# Patient Record
Sex: MALE | Race: WHITE | NOT HISPANIC OR LATINO | Employment: FULL TIME | URBAN - METROPOLITAN AREA
[De-identification: names, ages, dates, MRNs, and addresses within clinical notes are randomized per-mention and may not be internally consistent; named-entity substitution may affect disease eponyms.]

---

## 2017-10-18 ENCOUNTER — OFFICE VISIT (OUTPATIENT)
Dept: URGENT CARE | Facility: CLINIC | Age: 47
End: 2017-10-18
Payer: COMMERCIAL

## 2017-10-18 VITALS
BODY MASS INDEX: 34.55 KG/M2 | SYSTOLIC BLOOD PRESSURE: 183 MMHG | RESPIRATION RATE: 18 BRPM | HEART RATE: 99 BPM | OXYGEN SATURATION: 99 % | WEIGHT: 215 LBS | HEIGHT: 66 IN | DIASTOLIC BLOOD PRESSURE: 122 MMHG | TEMPERATURE: 99 F

## 2017-10-18 DIAGNOSIS — I10 HYPERTENSION, UNSPECIFIED TYPE: Primary | ICD-10-CM

## 2017-10-18 PROCEDURE — 99203 OFFICE O/P NEW LOW 30 MIN: CPT | Mod: S$GLB,,, | Performed by: EMERGENCY MEDICINE

## 2017-10-18 RX ORDER — LISINOPRIL 20 MG/1
20 TABLET ORAL DAILY
Qty: 30 TABLET | Refills: 0 | Status: SHIPPED | OUTPATIENT
Start: 2017-10-18 | End: 2017-11-06 | Stop reason: SDUPTHER

## 2017-10-18 NOTE — PROGRESS NOTES
Subjective:       Patient ID: Tracy Gabriel is a 47 y.o. male.    Chief Complaint: Hypertension (2 days )    Hypertension   This is a new problem. The current episode started in the past 7 days (2 days ago ). Pertinent negatives include no blurred vision, chest pain, headaches or shortness of breath. The current treatment provides no improvement.     Review of Systems   Constitution: Negative for chills and fever.   HENT: Negative for sore throat.    Eyes: Negative for blurred vision.   Cardiovascular: Negative for chest pain.   Respiratory: Negative for shortness of breath.    Skin: Negative for rash.   Musculoskeletal: Negative for back pain and joint pain.   Gastrointestinal: Negative for abdominal pain, diarrhea, nausea and vomiting.   Neurological: Negative for headaches.   Psychiatric/Behavioral: The patient is not nervous/anxious.        Objective:      Physical Exam   Constitutional: He is oriented to person, place, and time. He appears well-developed and well-nourished. He is cooperative.  Non-toxic appearance. He does not appear ill. No distress.   overweight   HENT:   Head: Normocephalic and atraumatic.   Right Ear: Hearing, tympanic membrane, external ear and ear canal normal.   Left Ear: Hearing, tympanic membrane, external ear and ear canal normal.   Nose: Nose normal. No mucosal edema, rhinorrhea or nasal deformity. No epistaxis. Right sinus exhibits no maxillary sinus tenderness and no frontal sinus tenderness. Left sinus exhibits no maxillary sinus tenderness and no frontal sinus tenderness.   Mouth/Throat: Uvula is midline, oropharynx is clear and moist and mucous membranes are normal. No trismus in the jaw. Normal dentition. No uvula swelling. No posterior oropharyngeal erythema.   Eyes: Conjunctivae and lids are normal. Right eye exhibits no discharge. Left eye exhibits no discharge. No scleral icterus.   Sclera clear bilat   Neck: Trachea normal, normal range of motion, full passive range  of motion without pain and phonation normal. Neck supple.   Cardiovascular: Normal rate, regular rhythm, normal heart sounds, intact distal pulses and normal pulses.    Pulmonary/Chest: Effort normal and breath sounds normal. No respiratory distress.   Abdominal: Soft. Normal appearance and bowel sounds are normal. He exhibits no distension, no pulsatile midline mass and no mass. There is no tenderness.   Musculoskeletal: Normal range of motion. He exhibits no edema or deformity.   Neurological: He is alert and oriented to person, place, and time. He exhibits normal muscle tone. Coordination normal.   Skin: Skin is warm, dry and intact. He is not diaphoretic. No pallor.   Psychiatric: He has a normal mood and affect. His speech is normal and behavior is normal. Judgment and thought content normal. Cognition and memory are normal.   Nursing note and vitals reviewed.      Assessment:       1. Hypertension, unspecified type        Plan:       Tracy was seen today for hypertension.    Diagnoses and all orders for this visit:    Hypertension, unspecified type    Other orders  -     lisinopril (PRINIVIL,ZESTRIL) 20 MG tablet; Take 1 tablet (20 mg total) by mouth once daily.

## 2017-10-18 NOTE — PATIENT INSTRUCTIONS
Go to the Emergency Room if symptoms or condition worsens in any way    Follow up with Primary Care Provider in 5-7 days      High Blood Pressure, New, Begin Treatment  Your blood pressure was high enough today to start treatment with medicines. Often health care providers dont know what causes high blood pressure (hypertension). But it can be controlled with lifestyle changes and medicines. High blood pressure usually has no symptoms. But it can sometimes cause headache, dizziness, blurred vision, a rushing sound in your ears, chest pain, or shortness of breath. But even without symptoms, high blood pressure thats not treated raises your risk for heart attack and stroke. High blood pressure is a serious health risk and shouldnt be ignored.    A blood pressure reading is made up of two numbers: a higher number over a lower number. The top number is the systolic pressure. The bottom number is the diastolic pressure. A normal blood pressure is a systolic pressure of less than 120 over a diastolic pressure less than 80. You will see your blood pressure readings written together. For example, a person with a systolic pressure of 118 and a diastolic pressure of 78 will have 118/78 written in the medical record.  High blood pressure is when either the top number is 140 or higher, or the bottom number is 90 or higher. High blood pressure is diagnosed when multiple, separate readings show blood pressures above 140/90. The blood pressures between normal and high are called prehypertension.  Home care  If you have high blood pressure, you should do what is listed below to lower your blood pressure. If you are taking medicines for high blood pressure, these methods may reduce or end your need for medicines in the future.  · Begin a weight-loss program if you are overweight.  · Cut back on how much salt you get in your diet. Heres how to do this:  ¨ Dont eat foods that have a lot of salt. These include olives, pickles,  smoked meats, and salted potato chips.  ¨ Dont add salt to your food at the table.  ¨ Use only small amounts of salt when cooking.  ¨ Review food labels to track how much salt is in prepared foods.  ¨ When eating out, ask that no additional salt be added to your food order.  · Begin an exercise program. Talk with your health care provider about the type of exercise program that would be best for you. It doesn't have to be hard. Even brisk walking for 20 minutes 3 times a week is a good form of exercise.  · Dont take medicines that have heart stimulants. This includes many over-the-counter cold and sinus decongestant pills and sprays, as well as diet pills. Check the warnings about hypertension on the label. Before purchasing any over-the-counter medicines or supplements, always ask the pharmacist about the product's potential interaction with your high blood pressure and your high blood pressure medicines.  · Stimulants such as amphetamine or cocaine could be lethal for someone with high blood pressure. Never take these.  · Limit how much caffeine you get in your diet. Switch to caffeine-free products.  · Stop smoking. If you are a long-time smoker, this can be hard. Enroll in a stop-smoking program to make it more likely that you will quit for good.  · Learn how to handle stress. This is an important part of any program to lower blood pressure. Learn about relaxation methods like meditation, yoga, or biofeedback.  · If your provider prescribed medicines, take them exactly as directed. Missing doses may cause your blood pressure get out of control.  · If you miss a dose or doses, check with your healthcare provider or pharmacist about what to do.  · Consider buying an automatic blood pressure machine. Your provider can make a recommendation. You can get one of these at most pharmacies.  The American Heart Association recommends the following guidelines for home blood pressure monitoring:  · Don't smoke or drink  coffee for 30 minutes before taking your blood pressure.  · Go to the bathroom before the test.  · Relax for 5 minutes before taking the measurement.  · Sit with your back supported (don't sit on a couch or soft chair); keep your feet on the floor uncrossed. Place your arm on a solid flat surface (like a table) with the upper part of the arm at heart level. Place the middle of the cuff directly above the eye of the elbow. Check the monitor's instruction manual for an illustration.  · Take multiple readings. When you measure, take 2 to 3 readings one minute apart and record all of the results.  · Take your blood pressure at the same time every day, or as your healthcare provider recommends.  · Record the date, time, and blood pressure reading.  · Take the record with you to your next medical appointment. If your blood pressure monitor has a built-in memory, simply take the monitor with you to your next appointment.  · Call your provider if you have several high readings. Don't be frightened by a single high blood pressure reading, but if you get several high readings, check in with your healthcare provider.  · Note: When blood pressure reaches a systolic (top number) of 180 or higher OR diastolic (bottom number) of 110 or higher, seek emergency medical treatment.  Follow-up care  Because a new blood pressure medicine was started today, its important that you have your blood pressure rechecked. This is to make sure that the medicine is working and that you have no serious side effects. Keep all your follow up appointments. Write down medicine and blood pressure questions and bring them to your next appointment. If you have pressing concerns about your new medicine or your blood pressure, call your provider. Unless told otherwise, follow up with your health care provider or this facility within the next 3 days.  When to seek medical advice  Call your healthcare provider right away if any of these occur:  · Blood  pressure reaches a systolic (top number) of 180 or higher, OR diastolic (bottom number) of 110 or higher, seek emergency medical treatment.  · Chest pain or shortness of breath  · Severe headache  · Throbbing or rushing sound in the ears  · Nosebleed  · Sudden severe pain in your belly (abdomen)  · Extreme drowsiness, confusion, or fainting  · Dizziness or dizziness with a spinning sensation (vertigo)  · Weakness of an arm or leg or one side of the face  · You have problems speaking or seeing   Date Last Reviewed: 12/1/2016  © 1122-5131 [employer]. [address] [place] [address]. All rights reserved. This information is not intended as a substitute for professional medical care. Always follow your healthcare professional's instructions.

## 2017-11-02 ENCOUNTER — LAB VISIT (OUTPATIENT)
Dept: LAB | Facility: HOSPITAL | Age: 47
End: 2017-11-02
Attending: FAMILY MEDICINE
Payer: COMMERCIAL

## 2017-11-02 ENCOUNTER — OFFICE VISIT (OUTPATIENT)
Dept: FAMILY MEDICINE | Facility: CLINIC | Age: 47
End: 2017-11-02
Attending: FAMILY MEDICINE
Payer: COMMERCIAL

## 2017-11-02 VITALS
DIASTOLIC BLOOD PRESSURE: 110 MMHG | RESPIRATION RATE: 16 BRPM | SYSTOLIC BLOOD PRESSURE: 180 MMHG | HEART RATE: 88 BPM | BODY MASS INDEX: 33.84 KG/M2 | OXYGEN SATURATION: 97 % | WEIGHT: 215.63 LBS | HEIGHT: 67 IN

## 2017-11-02 DIAGNOSIS — Z12.5 PROSTATE CANCER SCREENING: ICD-10-CM

## 2017-11-02 DIAGNOSIS — R06.81 WITNESSED APNEIC SPELLS: ICD-10-CM

## 2017-11-02 DIAGNOSIS — I10 ESSENTIAL HYPERTENSION: Primary | ICD-10-CM

## 2017-11-02 DIAGNOSIS — Z00.00 LABORATORY EXAM ORDERED AS PART OF ROUTINE GENERAL MEDICAL EXAMINATION: ICD-10-CM

## 2017-11-02 DIAGNOSIS — R06.83 SNORING: ICD-10-CM

## 2017-11-02 DIAGNOSIS — R40.0 DAYTIME SOMNOLENCE: ICD-10-CM

## 2017-11-02 PROCEDURE — 80061 LIPID PANEL: CPT

## 2017-11-02 PROCEDURE — 84153 ASSAY OF PSA TOTAL: CPT

## 2017-11-02 PROCEDURE — 85025 COMPLETE CBC W/AUTO DIFF WBC: CPT

## 2017-11-02 PROCEDURE — 84439 ASSAY OF FREE THYROXINE: CPT

## 2017-11-02 PROCEDURE — 90472 IMMUNIZATION ADMIN EACH ADD: CPT | Mod: S$GLB,,, | Performed by: FAMILY MEDICINE

## 2017-11-02 PROCEDURE — 99999 PR PBB SHADOW E&M-EST. PATIENT-LVL III: CPT | Mod: PBBFAC,,, | Performed by: FAMILY MEDICINE

## 2017-11-02 PROCEDURE — 90715 TDAP VACCINE 7 YRS/> IM: CPT | Mod: S$GLB,,, | Performed by: FAMILY MEDICINE

## 2017-11-02 PROCEDURE — 99205 OFFICE O/P NEW HI 60 MIN: CPT | Mod: 25,S$GLB,, | Performed by: FAMILY MEDICINE

## 2017-11-02 PROCEDURE — 80053 COMPREHEN METABOLIC PANEL: CPT

## 2017-11-02 PROCEDURE — 90686 IIV4 VACC NO PRSV 0.5 ML IM: CPT | Mod: S$GLB,,, | Performed by: FAMILY MEDICINE

## 2017-11-02 PROCEDURE — 36415 COLL VENOUS BLD VENIPUNCTURE: CPT | Mod: PO

## 2017-11-02 PROCEDURE — 90471 IMMUNIZATION ADMIN: CPT | Mod: S$GLB,,, | Performed by: FAMILY MEDICINE

## 2017-11-02 PROCEDURE — 84443 ASSAY THYROID STIM HORMONE: CPT

## 2017-11-02 NOTE — PROGRESS NOTES
Two patient identifiers verified. Allergies reviewed.  FLU Vaccine IM administered to Left deltoid and Tdap IM administered to Right deltoid per MD order. Patient tolerated injection well: no redness, bleeding, or bruising noted to injection site. Patient instructed to remain in clinic setting for 15 minutes.

## 2017-11-02 NOTE — PATIENT INSTRUCTIONS
Your test results will be communicated to you via : My [facility], Telephone or Letter.   If you have not received your test results in one week, please contact the clinic at [phone].

## 2017-11-03 PROBLEM — E78.5 HYPERLIPIDEMIA: Status: ACTIVE | Noted: 2017-11-03

## 2017-11-03 PROBLEM — R73.01 ELEVATED FASTING GLUCOSE: Status: ACTIVE | Noted: 2017-11-03

## 2017-11-03 LAB
ALBUMIN SERPL BCP-MCNC: 4.1 G/DL
ALP SERPL-CCNC: 61 U/L
ALT SERPL W/O P-5'-P-CCNC: 43 U/L
ANION GAP SERPL CALC-SCNC: 13 MMOL/L
AST SERPL-CCNC: 32 U/L
BASOPHILS # BLD AUTO: 0.03 K/UL
BASOPHILS NFR BLD: 0.5 %
BILIRUB SERPL-MCNC: 0.9 MG/DL
BUN SERPL-MCNC: 9 MG/DL
CALCIUM SERPL-MCNC: 9.9 MG/DL
CHLORIDE SERPL-SCNC: 103 MMOL/L
CHOLEST SERPL-MCNC: 263 MG/DL
CHOLEST/HDLC SERPL: 5.8 {RATIO}
CO2 SERPL-SCNC: 23 MMOL/L
COMPLEXED PSA SERPL-MCNC: 0.27 NG/ML
CREAT SERPL-MCNC: 0.9 MG/DL
DIFFERENTIAL METHOD: ABNORMAL
EOSINOPHIL # BLD AUTO: 0.2 K/UL
EOSINOPHIL NFR BLD: 3.2 %
ERYTHROCYTE [DISTWIDTH] IN BLOOD BY AUTOMATED COUNT: 11.5 %
EST. GFR  (AFRICAN AMERICAN): >60 ML/MIN/1.73 M^2
EST. GFR  (NON AFRICAN AMERICAN): >60 ML/MIN/1.73 M^2
GLUCOSE SERPL-MCNC: 115 MG/DL
HCT VFR BLD AUTO: 42.2 %
HDLC SERPL-MCNC: 45 MG/DL
HDLC SERPL: 17.1 %
HGB BLD-MCNC: 14.9 G/DL
IMM GRANULOCYTES # BLD AUTO: 0.05 K/UL
IMM GRANULOCYTES NFR BLD AUTO: 0.8 %
LDLC SERPL CALC-MCNC: ABNORMAL MG/DL
LYMPHOCYTES # BLD AUTO: 2 K/UL
LYMPHOCYTES NFR BLD: 30 %
MCH RBC QN AUTO: 31.6 PG
MCHC RBC AUTO-ENTMCNC: 35.3 G/DL
MCV RBC AUTO: 89 FL
MONOCYTES # BLD AUTO: 0.7 K/UL
MONOCYTES NFR BLD: 10.9 %
NEUTROPHILS # BLD AUTO: 3.6 K/UL
NEUTROPHILS NFR BLD: 54.6 %
NONHDLC SERPL-MCNC: 218 MG/DL
NRBC BLD-RTO: 0 /100 WBC
PLATELET # BLD AUTO: 272 K/UL
PMV BLD AUTO: 10 FL
POTASSIUM SERPL-SCNC: 4.2 MMOL/L
PROT SERPL-MCNC: 7.6 G/DL
RBC # BLD AUTO: 4.72 M/UL
SODIUM SERPL-SCNC: 139 MMOL/L
T4 FREE SERPL-MCNC: 1.05 NG/DL
TRIGL SERPL-MCNC: 482 MG/DL
TSH SERPL DL<=0.005 MIU/L-ACNC: 0.96 UIU/ML
WBC # BLD AUTO: 6.51 K/UL

## 2017-11-04 ENCOUNTER — PATIENT MESSAGE (OUTPATIENT)
Dept: FAMILY MEDICINE | Facility: CLINIC | Age: 47
End: 2017-11-04

## 2017-11-04 DIAGNOSIS — R06.81 WITNESSED APNEIC SPELLS: ICD-10-CM

## 2017-11-04 DIAGNOSIS — E78.5 HYPERLIPIDEMIA, UNSPECIFIED HYPERLIPIDEMIA TYPE: Primary | ICD-10-CM

## 2017-11-04 DIAGNOSIS — R73.01 ELEVATED FASTING GLUCOSE: ICD-10-CM

## 2017-11-04 DIAGNOSIS — R06.83 SNORING: ICD-10-CM

## 2017-11-06 RX ORDER — LISINOPRIL 20 MG/1
20 TABLET ORAL DAILY
Qty: 30 TABLET | Refills: 0 | Status: SHIPPED | OUTPATIENT
Start: 2017-11-06 | End: 2017-12-08 | Stop reason: SDUPTHER

## 2017-11-06 NOTE — TELEPHONE ENCOUNTER
----- Message from Connie Flanagan sent at 11/6/2017  3:54 PM CST -----  Contact: pt   x  1st Request  _  2nd Request  _  3rd Request      Please refill the medication(s) listed below. Please call the patient when the prescription(s) is ready for  at the phone number [phone].     Pt states that remanding medication fell in toilet.      Medication #1 lisinopril (PRINIVIL,ZESTRIL) 20 MG tablet 30 tablet     Medication #2      Preferred Pharmacy:  [facility] [address] [place] [address]

## 2017-11-14 ENCOUNTER — PATIENT MESSAGE (OUTPATIENT)
Dept: FAMILY MEDICINE | Facility: CLINIC | Age: 47
End: 2017-11-14

## 2017-11-14 ENCOUNTER — LAB VISIT (OUTPATIENT)
Dept: LAB | Facility: HOSPITAL | Age: 47
End: 2017-11-14
Attending: FAMILY MEDICINE
Payer: COMMERCIAL

## 2017-11-14 DIAGNOSIS — R73.01 ELEVATED FASTING GLUCOSE: ICD-10-CM

## 2017-11-14 LAB
ESTIMATED AVG GLUCOSE: 163 MG/DL
HBA1C MFR BLD HPLC: 7.3 %

## 2017-11-14 PROCEDURE — 83036 HEMOGLOBIN GLYCOSYLATED A1C: CPT

## 2017-11-14 PROCEDURE — 36415 COLL VENOUS BLD VENIPUNCTURE: CPT | Mod: PO

## 2017-11-17 ENCOUNTER — OFFICE VISIT (OUTPATIENT)
Dept: FAMILY MEDICINE | Facility: CLINIC | Age: 47
End: 2017-11-17
Attending: FAMILY MEDICINE
Payer: COMMERCIAL

## 2017-11-17 VITALS
WEIGHT: 217.13 LBS | OXYGEN SATURATION: 98 % | DIASTOLIC BLOOD PRESSURE: 90 MMHG | SYSTOLIC BLOOD PRESSURE: 150 MMHG | HEIGHT: 66 IN | BODY MASS INDEX: 34.9 KG/M2 | HEART RATE: 77 BPM

## 2017-11-17 DIAGNOSIS — E11.59 HYPERTENSION ASSOCIATED WITH DIABETES: ICD-10-CM

## 2017-11-17 DIAGNOSIS — Z13.5 DIABETIC RETINOPATHY SCREENING: ICD-10-CM

## 2017-11-17 DIAGNOSIS — I15.2 HYPERTENSION ASSOCIATED WITH DIABETES: ICD-10-CM

## 2017-11-17 DIAGNOSIS — E11.9 CONTROLLED TYPE 2 DIABETES MELLITUS WITHOUT COMPLICATION, WITHOUT LONG-TERM CURRENT USE OF INSULIN: Primary | ICD-10-CM

## 2017-11-17 DIAGNOSIS — E78.5 HYPERLIPIDEMIA ASSOCIATED WITH TYPE 2 DIABETES MELLITUS: ICD-10-CM

## 2017-11-17 DIAGNOSIS — E11.69 HYPERLIPIDEMIA ASSOCIATED WITH TYPE 2 DIABETES MELLITUS: ICD-10-CM

## 2017-11-17 PROCEDURE — 99213 OFFICE O/P EST LOW 20 MIN: CPT | Mod: S$GLB,,, | Performed by: FAMILY MEDICINE

## 2017-11-17 PROCEDURE — 99999 PR PBB SHADOW E&M-EST. PATIENT-LVL III: CPT | Mod: PBBFAC,,, | Performed by: FAMILY MEDICINE

## 2017-11-17 RX ORDER — AMLODIPINE BESYLATE 2.5 MG/1
2.5 TABLET ORAL DAILY
Qty: 30 TABLET | Refills: 0 | Status: SHIPPED | OUTPATIENT
Start: 2017-11-17 | End: 2017-12-11 | Stop reason: SDUPTHER

## 2017-11-17 RX ORDER — ATORVASTATIN CALCIUM 40 MG/1
40 TABLET, FILM COATED ORAL DAILY
Qty: 30 TABLET | Refills: 2 | Status: SHIPPED | OUTPATIENT
Start: 2017-11-17 | End: 2018-01-20 | Stop reason: SDUPTHER

## 2017-11-17 RX ORDER — METFORMIN HYDROCHLORIDE 500 MG/1
500 TABLET ORAL 2 TIMES DAILY WITH MEALS
Qty: 60 TABLET | Refills: 2 | Status: SHIPPED | OUTPATIENT
Start: 2017-11-17 | End: 2018-01-20 | Stop reason: SDUPTHER

## 2017-11-20 ENCOUNTER — PATIENT MESSAGE (OUTPATIENT)
Dept: DIABETES | Facility: CLINIC | Age: 47
End: 2017-11-20

## 2017-11-20 NOTE — PROGRESS NOTES
Subjective:       Patient ID: Tracy Gabriel is a 47 y.o. male.    Chief Complaint: Hypertension    HPI    The patient return today to review his most recent laboratory investigation. This is consistent with type II diabetes.    Lab Visit on 11/14/2017   Component Date Value Ref Range Status    Microalbum.,U,Random 11/14/2017 13.0  ug/mL Final    Creatinine, Random Ur 11/14/2017 136.0  23.0 - 375.0 mg/dL Final    Microalb Creat Ratio 11/14/2017 9.6  0.0 - 30.0 ug/mg Final   Lab Visit on 11/14/2017   Component Date Value Ref Range Status    Hemoglobin A1C 11/14/2017 7.3* 4.0 - 5.6 % Final    Estimated Avg Glucose 11/14/2017 163* 68 - 131 mg/dL Final   Lab Visit on 11/02/2017   Component Date Value Ref Range Status    WBC 11/03/2017 6.51  3.90 - 12.70 K/uL Final    RBC 11/03/2017 4.72  4.60 - 6.20 M/uL Final    Hemoglobin 11/03/2017 14.9  14.0 - 18.0 g/dL Final    Hematocrit 11/03/2017 42.2  40.0 - 54.0 % Final    MCV 11/03/2017 89  82 - 98 fL Final    MCH 11/03/2017 31.6* 27.0 - 31.0 pg Final    MCHC 11/03/2017 35.3  32.0 - 36.0 g/dL Final    RDW 11/03/2017 11.5  11.5 - 14.5 % Final    Platelets 11/03/2017 272  150 - 350 K/uL Final    MPV 11/03/2017 10.0  9.2 - 12.9 fL Final    Immature Granulocytes 11/03/2017 0.8* 0.0 - 0.5 % Final    Gran # 11/03/2017 3.6  1.8 - 7.7 K/uL Final    Immature Grans (Abs) 11/03/2017 0.05* 0.00 - 0.04 K/uL Final    Lymph # 11/03/2017 2.0  1.0 - 4.8 K/uL Final    Mono # 11/03/2017 0.7  0.3 - 1.0 K/uL Final    Eos # 11/03/2017 0.2  0.0 - 0.5 K/uL Final    Baso # 11/03/2017 0.03  0.00 - 0.20 K/uL Final    nRBC 11/03/2017 0  0 /100 WBC Final    Gran% 11/03/2017 54.6  38.0 - 73.0 % Final    Lymph% 11/03/2017 30.0  18.0 - 48.0 % Final    Mono% 11/03/2017 10.9  4.0 - 15.0 % Final    Eosinophil% 11/03/2017 3.2  0.0 - 8.0 % Final    Basophil% 11/03/2017 0.5  0.0 - 1.9 % Final    Differential Method 11/03/2017 Automated   Final    Sodium 11/03/2017 139  136 - 145  mmol/L Final    Potassium 11/03/2017 4.2  3.5 - 5.1 mmol/L Final    Chloride 11/03/2017 103  95 - 110 mmol/L Final    CO2 11/03/2017 23  23 - 29 mmol/L Final    Glucose 11/03/2017 115* 70 - 110 mg/dL Final    BUN, Bld 11/03/2017 9  6 - 20 mg/dL Final    Creatinine 11/03/2017 0.9  0.5 - 1.4 mg/dL Final    Calcium 11/03/2017 9.9  8.7 - 10.5 mg/dL Final    Total Protein 11/03/2017 7.6  6.0 - 8.4 g/dL Final    Albumin 11/03/2017 4.1  3.5 - 5.2 g/dL Final    Total Bilirubin 11/03/2017 0.9  0.1 - 1.0 mg/dL Final    Alkaline Phosphatase 11/03/2017 61  55 - 135 U/L Final    AST 11/03/2017 32  10 - 40 U/L Final    ALT 11/03/2017 43  10 - 44 U/L Final    Anion Gap 11/03/2017 13  8 - 16 mmol/L Final    eGFR if African American 11/03/2017 >60.0  >60 mL/min/1.73 m^2 Final    eGFR if non African American 11/03/2017 >60.0  >60 mL/min/1.73 m^2 Final    PSA, SCREEN 11/03/2017 0.27  0.00 - 4.00 ng/mL Final    TSH 11/03/2017 0.959  0.400 - 4.000 uIU/mL Final    Free T4 11/03/2017 1.05  0.71 - 1.51 ng/dL Final    Cholesterol 11/03/2017 263* 120 - 199 mg/dL Final    Triglycerides 11/03/2017 482* 30 - 150 mg/dL Final    HDL 11/03/2017 45  40 - 75 mg/dL Final    LDL Cholesterol 11/03/2017 Invalid, Trig>400.0  63.0 - 159.0 mg/dL Final    HDL/Chol Ratio 11/03/2017 17.1* 20.0 - 50.0 % Final    Total Cholesterol/HDL Ratio 11/03/2017 5.8* 2.0 - 5.0 Final    Non-HDL Cholesterol 11/03/2017 218  mg/dL Final     Patient Active Problem List   Diagnosis    Controlled type 2 diabetes mellitus without complication, without long-term current use of insulin    Hyperlipidemia associated with type 2 diabetes mellitus    Snoring    Witnessed apneic spells    Hypertension associated with diabetes       Current Outpatient Prescriptions:     lisinopril (PRINIVIL,ZESTRIL) 20 MG tablet, Take 1 tablet (20 mg total) by mouth once daily., Disp: 30 tablet, Rfl: 0    amLODIPine (NORVASC) 2.5 MG tablet, Take 1 tablet (2.5 mg total) by  "mouth once daily., Disp: 30 tablet, Rfl: 0    atorvastatin (LIPITOR) 40 MG tablet, Take 1 tablet (40 mg total) by mouth once daily., Disp: 30 tablet, Rfl: 2    metFORMIN (GLUCOPHAGE) 500 MG tablet, Take 1 tablet (500 mg total) by mouth 2 (two) times daily with meals., Disp: 60 tablet, Rfl: 2    The following portions of the patient's history were reviewed and updated as appropriate: allergies, past family history, past medical history, past social history and past surgical history.    Review of Systems    Other than history of present illness, noncontributory.    Objective:      Physical Exam    BP (!) 150/90 (BP Location: Left arm, Patient Position: Sitting, BP Method: Large (Manual))   Pulse 77   Ht 5' 6" (1.676 m)   Wt 98.5 kg (217 lb 1.6 oz)   SpO2 98%   BMI 35.04 kg/m²     The entirety of today's visit was devoted to counseling.  The visit lasted 20 minutes.    Assessment:       1. Controlled type 2 diabetes mellitus without complication, without long-term current use of insulin    2. Hypertension associated with diabetes    3. Hyperlipidemia associated with type 2 diabetes mellitus        Plan:       Long discuss with patient concerning pathogenesis of diabetes, and treatment options, with short-term and long-term goals.  We also discussed the importance of tracking a balance between dietary intake, exercise/activity level, and medications.  He agrees to a trial of metformin and atorvastatin, with amlodipine for blood pressure control.  He also agrees to see the certified diabetes educator.    Orders Placed This Encounter    Ambulatory Referral to Diabetes Education    metFORMIN (GLUCOPHAGE) 500 MG tablet    atorvastatin (LIPITOR) 40 MG tablet    amLODIPine (NORVASC) 2.5 MG tablet         "This note will not be shared with the patient."  "

## 2017-11-29 ENCOUNTER — TELEPHONE (OUTPATIENT)
Dept: SLEEP MEDICINE | Facility: CLINIC | Age: 47
End: 2017-11-29

## 2017-12-08 RX ORDER — LISINOPRIL 20 MG/1
20 TABLET ORAL DAILY
Qty: 90 TABLET | Refills: 1 | Status: SHIPPED | OUTPATIENT
Start: 2017-12-08 | End: 2018-03-03 | Stop reason: SDUPTHER

## 2017-12-11 DIAGNOSIS — E11.9 CONTROLLED TYPE 2 DIABETES MELLITUS WITHOUT COMPLICATION, WITHOUT LONG-TERM CURRENT USE OF INSULIN: ICD-10-CM

## 2017-12-11 DIAGNOSIS — E11.59 HYPERTENSION ASSOCIATED WITH DIABETES: ICD-10-CM

## 2017-12-11 DIAGNOSIS — E11.69 HYPERLIPIDEMIA ASSOCIATED WITH TYPE 2 DIABETES MELLITUS: ICD-10-CM

## 2017-12-11 DIAGNOSIS — E78.5 HYPERLIPIDEMIA ASSOCIATED WITH TYPE 2 DIABETES MELLITUS: ICD-10-CM

## 2017-12-11 DIAGNOSIS — I15.2 HYPERTENSION ASSOCIATED WITH DIABETES: ICD-10-CM

## 2017-12-11 RX ORDER — METFORMIN HYDROCHLORIDE 500 MG/1
500 TABLET ORAL 2 TIMES DAILY WITH MEALS
Qty: 60 TABLET | Refills: 2 | OUTPATIENT
Start: 2017-12-11 | End: 2018-12-11

## 2017-12-11 RX ORDER — ATORVASTATIN CALCIUM 40 MG/1
40 TABLET, FILM COATED ORAL DAILY
Qty: 30 TABLET | Refills: 2 | OUTPATIENT
Start: 2017-12-11 | End: 2018-12-11

## 2017-12-11 RX ORDER — AMLODIPINE BESYLATE 2.5 MG/1
2.5 TABLET ORAL DAILY
Qty: 30 TABLET | Refills: 1 | Status: SHIPPED | OUTPATIENT
Start: 2017-12-11 | End: 2018-01-20 | Stop reason: SDUPTHER

## 2017-12-12 ENCOUNTER — TELEPHONE (OUTPATIENT)
Dept: SLEEP MEDICINE | Facility: CLINIC | Age: 47
End: 2017-12-12

## 2017-12-21 ENCOUNTER — CLINICAL SUPPORT (OUTPATIENT)
Dept: DIABETES | Facility: CLINIC | Age: 47
End: 2017-12-21
Payer: COMMERCIAL

## 2017-12-21 VITALS — BODY MASS INDEX: 32.88 KG/M2 | WEIGHT: 203.69 LBS

## 2017-12-21 DIAGNOSIS — E11.9 DIABETES MELLITUS WITHOUT COMPLICATION: Primary | ICD-10-CM

## 2017-12-21 DIAGNOSIS — E11.9 CONTROLLED TYPE 2 DIABETES MELLITUS WITHOUT COMPLICATION, WITHOUT LONG-TERM CURRENT USE OF INSULIN: ICD-10-CM

## 2017-12-21 PROCEDURE — G0108 DIAB MANAGE TRN  PER INDIV: HCPCS | Mod: S$GLB,,, | Performed by: DIETITIAN, REGISTERED

## 2017-12-21 RX ORDER — BLOOD-GLUCOSE METER
1 EACH MISCELLANEOUS DAILY
Qty: 25 STRIP | Refills: 11 | Status: SHIPPED | OUTPATIENT
Start: 2017-12-21

## 2017-12-21 NOTE — PROGRESS NOTES
"Diabetes Education  Author: Dia Galdamez RD  Date: 12/21/2017    Diabetes Education Visit  Diabetes Education Record Assessment/Progress: Initial    Diabetes Type  Diabetes Type : Type II    Diabetes History  Diabetes Diagnosis: 0-1 year (new dx)    Nutrition  Meal Planning: water, diet drinks (had been eating "carb heavy" lacto- ovo- vegetarian diet, now has decreased simple carbs and returned to vegan diet)  Meal Plan 24 Hour Recall - Breakfast: 7:30-8 - whole bran cereal with banana OR soyrizzo, whole wheat janet  Meal Plan 24 Hour Recall - Lunch: whenever I can  - leftovers (black bean chili or veggie sidhu), occasionally veggie gamaliel at [employer]  Meal Plan 24 Hour Recall - Dinner: veggie sidhu, lentil based or tempeh based  Meal Plan 24 Hour Recall - Snack: banana or orange    Monitoring   Self Monitoring : does not have a meter  Blood Glucose Logs: No    Exercise   Exercise Type: bike riding  Frequency: 3-5 Times per week    Current Diabetes Treatment   Current Treatment: Oral Medication (metformin 500mg BID)    Social History  Preferred Learning Method: Face to Face, Reading Materials  Primary Support: Self, Spouse (wife attended visit)  Occupation: drives a [employer]                                Barriers to Change  Barriers to Change: None  Learning Challenges : None    Readiness to Learn   Readiness to Learn : Acceptance    Cultural Influences  Cultural Influences: No    Diabetes Education Assessment/Progress    Diabetes Disease Process (diabetes disease process and treatment options): Discussion, Individual Session, Demonstrates Understanding/Competency(verbalizes/demonstrates), Instructed, Written Materials Provided (Discussed diabetes disease risk factors and progression. )    Nutrition (Incorporating nutritional management into one's lifestyle): Discussion, Individual Session, Needs Instruction, Instructed, Written Materials Provided (Following vegan diet. Applauded this change. Discussed benefits of vegan " diet on BG control. He is getting good sources of plant proteins, complete proteins, and incorporating sources of B12 in diet. Likes sweets but has cut back to occasional treats. Reinforced limiting sweets to special occasions, portion control, choosing healthy carbs, spacing meals 4-5 hours apart, 3 meals daily and limit snacking to 1-2 times daily.)    Physical Activity (incorporating physical activity into one's lifestyle): Discussion, Needs Review, Individual Session, Instructed, Written Materials Provided (Encouraged 150 min of mod physical activity per week. )    Medications (states correct name, dose, onset, peak, duration, side effects & timing of meds): Discussion, Individual Session, Needs Instruction, Instructed, Written Materials Provided, Comprehends Key Points (Reviewed timing, dosage and MOA of Metformin. Advised Metformin can increase risk of B12 deficiency and recommend checking B12 and folate levels in ~6 months. )    Monitoring (monitoring blood glucose/other parameters & using results): Discussion, Demonstration, Return Demonstration, Needs Instruction, Individual Session, Instructed, Written Materials Provided (Provided and trained on usage of One Touch Verio meter. Advised on goal BG readings, SMBG once daily, alternating times. )    Acute Complications (preventing, detecting, and treating acute complications): Discussion, Individual Session, Needs Instruction, Instructed, Written Materials Provided (Discussed low risk of hypoglycemia with metformin. Reviewed s/s and management of hypoglycemia and hyperglycemia. )    Chronic Complications (preventing, detecting, and treating chronic complications): Discussion, Individual Session, Needs Instruction, Instructed, Written Materials Provided (Reviewed standards of care. )    Clinical (diabetes and other pertinent medical history): Discussion, Individual Session, Demonstrates Understanding/Competency (verbalizes/demonstrates)    Cognitive (knowledge  of self-management skills, functional health literacy): Discussion, Individual Session, Demonstrates Understanding/Competency (verbalizes/demonstrates)    Psychosocial (emotional response to diabetes): Discussion, Individual Session, Demonstrates Understanding/Competency (verbalizes/demonstrates)    Diabetes Distress and Support Systems: Discussion, Individual Session, Demonstrates Understanding/Competency (verbalizes/demonstrates)    Behavioral (readiness for change, lifestyle practices, self-care behaviors): Discussion, Individual Session, Demonstrates Understanding/Competency (verbalizes/demonstrates)    Goals  Patient has selected/evaluated goals during today's session: Yes, selected  Monitoring: Set (Will SMBG once daily)  Start Date: 12/21/17  Target Date: 03/21/18         Diabetes Care Plan/Intervention  Education Plan/Intervention: Individual Follow-Up DSMT    Diabetes Meal Plan  Carbohydrate Per Meal: 45-60g  Carbohydrate Per Snack : 15-20g    Education Units of Time   Time Spent: 60 min      Health Maintenance Topics with due status: Not Due       Topic Last Completion Date    TETANUS VACCINE 11/02/2017    Lipid Panel 11/02/2017    Hemoglobin A1c 11/14/2017     Health Maintenance Due   Topic Date Due    Foot Exam  02/14/1979    Eye Exam  02/14/1979    Pneumococcal PPSV23 (Medium Risk) (1) 02/14/1987

## 2018-01-10 ENCOUNTER — TELEPHONE (OUTPATIENT)
Dept: SLEEP MEDICINE | Facility: CLINIC | Age: 48
End: 2018-01-10

## 2018-01-10 ENCOUNTER — PATIENT MESSAGE (OUTPATIENT)
Dept: SLEEP MEDICINE | Facility: CLINIC | Age: 48
End: 2018-01-10

## 2018-01-10 NOTE — TELEPHONE ENCOUNTER
----- Message from Dia Novak sent at 1/10/2018  3:39 PM CST -----  Contact: Self  X   1st Request  _  2nd Request  _  3rd Request        Who: PREM ELI [[id number]]    Why: Requesting a call back in regards to an appt he was advised needed to be rescheduled. Attempt made,first available is 02/19. Pt states his referral is to see Dr. Luu only.Pt states he needs to be seen sooner.    What Number to Call Back: [phone]    When to Expect a call back: (Within 24 hours)    Please return the call at earliest convenience. Thanks!

## 2018-01-20 DIAGNOSIS — E78.5 HYPERLIPIDEMIA ASSOCIATED WITH TYPE 2 DIABETES MELLITUS: ICD-10-CM

## 2018-01-20 DIAGNOSIS — I15.2 HYPERTENSION ASSOCIATED WITH DIABETES: ICD-10-CM

## 2018-01-20 DIAGNOSIS — E11.69 HYPERLIPIDEMIA ASSOCIATED WITH TYPE 2 DIABETES MELLITUS: ICD-10-CM

## 2018-01-20 DIAGNOSIS — E11.9 CONTROLLED TYPE 2 DIABETES MELLITUS WITHOUT COMPLICATION, WITHOUT LONG-TERM CURRENT USE OF INSULIN: ICD-10-CM

## 2018-01-20 DIAGNOSIS — E11.59 HYPERTENSION ASSOCIATED WITH DIABETES: ICD-10-CM

## 2018-01-20 RX ORDER — AMLODIPINE BESYLATE 2.5 MG/1
2.5 TABLET ORAL DAILY
Qty: 30 TABLET | Refills: 1 | Status: SHIPPED | OUTPATIENT
Start: 2018-01-20 | End: 2018-05-14 | Stop reason: SDUPTHER

## 2018-01-20 RX ORDER — METFORMIN HYDROCHLORIDE 500 MG/1
500 TABLET ORAL 2 TIMES DAILY WITH MEALS
Qty: 60 TABLET | Refills: 2 | Status: SHIPPED | OUTPATIENT
Start: 2018-01-20 | End: 2018-05-14 | Stop reason: SDUPTHER

## 2018-01-20 RX ORDER — ATORVASTATIN CALCIUM 40 MG/1
40 TABLET, FILM COATED ORAL DAILY
Qty: 30 TABLET | Refills: 2 | Status: SHIPPED | OUTPATIENT
Start: 2018-01-20 | End: 2018-05-14 | Stop reason: SDUPTHER

## 2018-03-03 DIAGNOSIS — E11.9 CONTROLLED TYPE 2 DIABETES MELLITUS WITHOUT COMPLICATION, WITHOUT LONG-TERM CURRENT USE OF INSULIN: Primary | ICD-10-CM

## 2018-03-03 DIAGNOSIS — I15.2 HYPERTENSION ASSOCIATED WITH DIABETES: ICD-10-CM

## 2018-03-03 DIAGNOSIS — E11.69 HYPERLIPIDEMIA ASSOCIATED WITH TYPE 2 DIABETES MELLITUS: ICD-10-CM

## 2018-03-03 DIAGNOSIS — E78.5 HYPERLIPIDEMIA ASSOCIATED WITH TYPE 2 DIABETES MELLITUS: ICD-10-CM

## 2018-03-03 DIAGNOSIS — E11.59 HYPERTENSION ASSOCIATED WITH DIABETES: ICD-10-CM

## 2018-03-03 RX ORDER — LISINOPRIL 20 MG/1
20 TABLET ORAL DAILY
Qty: 90 TABLET | Refills: 1 | Status: SHIPPED | OUTPATIENT
Start: 2018-03-03 | End: 2018-06-19 | Stop reason: SDUPTHER

## 2018-03-03 NOTE — TELEPHONE ENCOUNTER
Patient was to return to clinic in February for follow-up.  Please schedule lab appt (the orders), and return visit with me.

## 2018-05-14 DIAGNOSIS — E11.59 HYPERTENSION ASSOCIATED WITH DIABETES: ICD-10-CM

## 2018-05-14 DIAGNOSIS — I15.2 HYPERTENSION ASSOCIATED WITH DIABETES: ICD-10-CM

## 2018-05-14 DIAGNOSIS — E11.9 CONTROLLED TYPE 2 DIABETES MELLITUS WITHOUT COMPLICATION, WITHOUT LONG-TERM CURRENT USE OF INSULIN: ICD-10-CM

## 2018-05-14 DIAGNOSIS — E78.5 HYPERLIPIDEMIA ASSOCIATED WITH TYPE 2 DIABETES MELLITUS: ICD-10-CM

## 2018-05-14 DIAGNOSIS — E11.69 HYPERLIPIDEMIA ASSOCIATED WITH TYPE 2 DIABETES MELLITUS: ICD-10-CM

## 2018-05-14 RX ORDER — AMLODIPINE BESYLATE 2.5 MG/1
2.5 TABLET ORAL DAILY
Qty: 30 TABLET | Refills: 0 | Status: SHIPPED | OUTPATIENT
Start: 2018-05-14 | End: 2018-06-19 | Stop reason: SDUPTHER

## 2018-05-14 RX ORDER — METFORMIN HYDROCHLORIDE 500 MG/1
500 TABLET ORAL 2 TIMES DAILY WITH MEALS
Qty: 60 TABLET | Refills: 0 | Status: SHIPPED | OUTPATIENT
Start: 2018-05-14 | End: 2018-11-02 | Stop reason: SDUPTHER

## 2018-05-14 RX ORDER — LISINOPRIL 20 MG/1
20 TABLET ORAL DAILY
Qty: 90 TABLET | Refills: 1 | Status: CANCELLED | OUTPATIENT
Start: 2018-05-14 | End: 2019-05-14

## 2018-05-14 RX ORDER — ATORVASTATIN CALCIUM 40 MG/1
40 TABLET, FILM COATED ORAL DAILY
Qty: 30 TABLET | Refills: 0 | Status: SHIPPED | OUTPATIENT
Start: 2018-05-14 | End: 2018-06-19 | Stop reason: SDUPTHER

## 2018-05-14 NOTE — TELEPHONE ENCOUNTER
Medications refilled, for 1 month only.  Patient did not respond to our appointment reminder sent to him in February.  That was 3 months ago.  It is now been 6 months since his last visit to the office, and he is yet to schedule a return visit.

## 2018-06-19 DIAGNOSIS — E78.5 HYPERLIPIDEMIA ASSOCIATED WITH TYPE 2 DIABETES MELLITUS: ICD-10-CM

## 2018-06-19 DIAGNOSIS — E11.69 HYPERLIPIDEMIA ASSOCIATED WITH TYPE 2 DIABETES MELLITUS: ICD-10-CM

## 2018-06-19 DIAGNOSIS — E11.59 HYPERTENSION ASSOCIATED WITH DIABETES: ICD-10-CM

## 2018-06-19 DIAGNOSIS — I15.2 HYPERTENSION ASSOCIATED WITH DIABETES: ICD-10-CM

## 2018-06-19 RX ORDER — AMLODIPINE BESYLATE 2.5 MG/1
2.5 TABLET ORAL DAILY
Qty: 10 TABLET | Refills: 0 | Status: SHIPPED | OUTPATIENT
Start: 2018-06-19 | End: 2018-07-02 | Stop reason: SDUPTHER

## 2018-06-19 RX ORDER — LISINOPRIL 20 MG/1
20 TABLET ORAL DAILY
Qty: 10 TABLET | Refills: 0 | Status: SHIPPED | OUTPATIENT
Start: 2018-06-19 | End: 2018-07-02 | Stop reason: SDUPTHER

## 2018-06-19 RX ORDER — ATORVASTATIN CALCIUM 40 MG/1
40 TABLET, FILM COATED ORAL DAILY
Qty: 10 TABLET | Refills: 0 | Status: SHIPPED | OUTPATIENT
Start: 2018-06-19 | End: 2018-07-02 | Stop reason: SDUPTHER

## 2018-06-26 NOTE — TELEPHONE ENCOUNTER
Attempted to contact the patient and facilitate an appt. The patient did not answer. Left voicemail to return call.

## 2018-07-02 DIAGNOSIS — E11.69 HYPERLIPIDEMIA ASSOCIATED WITH TYPE 2 DIABETES MELLITUS: ICD-10-CM

## 2018-07-02 DIAGNOSIS — E11.59 HYPERTENSION ASSOCIATED WITH DIABETES: ICD-10-CM

## 2018-07-02 DIAGNOSIS — E78.5 HYPERLIPIDEMIA ASSOCIATED WITH TYPE 2 DIABETES MELLITUS: ICD-10-CM

## 2018-07-02 DIAGNOSIS — I15.2 HYPERTENSION ASSOCIATED WITH DIABETES: ICD-10-CM

## 2018-07-03 RX ORDER — LISINOPRIL 20 MG/1
20 TABLET ORAL DAILY
Qty: 30 TABLET | Refills: 0 | Status: SHIPPED | OUTPATIENT
Start: 2018-07-03 | End: 2018-08-31 | Stop reason: SDUPTHER

## 2018-07-03 RX ORDER — ATORVASTATIN CALCIUM 40 MG/1
40 TABLET, FILM COATED ORAL DAILY
Qty: 30 TABLET | Refills: 0 | Status: SHIPPED | OUTPATIENT
Start: 2018-07-03 | End: 2018-08-31 | Stop reason: SDUPTHER

## 2018-07-03 RX ORDER — AMLODIPINE BESYLATE 2.5 MG/1
2.5 TABLET ORAL DAILY
Qty: 30 TABLET | Refills: 0 | Status: SHIPPED | OUTPATIENT
Start: 2018-07-03 | End: 2018-08-31 | Stop reason: SDUPTHER

## 2018-08-31 DIAGNOSIS — I15.2 HYPERTENSION ASSOCIATED WITH DIABETES: ICD-10-CM

## 2018-08-31 DIAGNOSIS — E78.5 HYPERLIPIDEMIA ASSOCIATED WITH TYPE 2 DIABETES MELLITUS: ICD-10-CM

## 2018-08-31 DIAGNOSIS — E11.59 HYPERTENSION ASSOCIATED WITH DIABETES: ICD-10-CM

## 2018-08-31 DIAGNOSIS — E11.69 HYPERLIPIDEMIA ASSOCIATED WITH TYPE 2 DIABETES MELLITUS: ICD-10-CM

## 2018-08-31 NOTE — TELEPHONE ENCOUNTER
Kimberli, please contact the patient to facilitate appt with Dr. Koenig. Patient is overdue for an appt and canceled his last appt on 07/18/18.

## 2018-09-05 RX ORDER — AMLODIPINE BESYLATE 2.5 MG/1
2.5 TABLET ORAL DAILY
Qty: 30 TABLET | Refills: 0 | Status: SHIPPED | OUTPATIENT
Start: 2018-09-05 | End: 2018-11-02 | Stop reason: SDUPTHER

## 2018-09-05 RX ORDER — LISINOPRIL 20 MG/1
20 TABLET ORAL DAILY
Qty: 30 TABLET | Refills: 0 | Status: SHIPPED | OUTPATIENT
Start: 2018-09-05 | End: 2018-11-02 | Stop reason: SDUPTHER

## 2018-09-05 RX ORDER — ATORVASTATIN CALCIUM 40 MG/1
40 TABLET, FILM COATED ORAL DAILY
Qty: 30 TABLET | Refills: 0 | Status: SHIPPED | OUTPATIENT
Start: 2018-09-05 | End: 2018-11-02 | Stop reason: SDUPTHER

## 2018-09-05 NOTE — TELEPHONE ENCOUNTER
Kimberli SHUKLA states patient scheduled a lab appt and states he will schedule follow up visit when he comes in for his lab appt.

## 2018-09-06 ENCOUNTER — LAB VISIT (OUTPATIENT)
Dept: LAB | Facility: HOSPITAL | Age: 48
End: 2018-09-06
Attending: FAMILY MEDICINE
Payer: COMMERCIAL

## 2018-09-06 DIAGNOSIS — E11.69 HYPERLIPIDEMIA ASSOCIATED WITH TYPE 2 DIABETES MELLITUS: ICD-10-CM

## 2018-09-06 DIAGNOSIS — E78.5 HYPERLIPIDEMIA ASSOCIATED WITH TYPE 2 DIABETES MELLITUS: ICD-10-CM

## 2018-09-06 DIAGNOSIS — E11.9 CONTROLLED TYPE 2 DIABETES MELLITUS WITHOUT COMPLICATION, WITHOUT LONG-TERM CURRENT USE OF INSULIN: ICD-10-CM

## 2018-09-06 LAB
ALBUMIN SERPL BCP-MCNC: 4.3 G/DL
ALP SERPL-CCNC: 57 U/L
ALT SERPL W/O P-5'-P-CCNC: 43 U/L
ANION GAP SERPL CALC-SCNC: 13 MMOL/L
AST SERPL-CCNC: 37 U/L
BILIRUB SERPL-MCNC: 1.8 MG/DL
BUN SERPL-MCNC: 10 MG/DL
CALCIUM SERPL-MCNC: 9.8 MG/DL
CHLORIDE SERPL-SCNC: 100 MMOL/L
CHOLEST SERPL-MCNC: 213 MG/DL
CHOLEST/HDLC SERPL: 5.1 {RATIO}
CO2 SERPL-SCNC: 25 MMOL/L
CREAT SERPL-MCNC: 1 MG/DL
EST. GFR  (AFRICAN AMERICAN): >60 ML/MIN/1.73 M^2
EST. GFR  (NON AFRICAN AMERICAN): >60 ML/MIN/1.73 M^2
ESTIMATED AVG GLUCOSE: 128 MG/DL
GLUCOSE SERPL-MCNC: 120 MG/DL
HBA1C MFR BLD HPLC: 6.1 %
HDLC SERPL-MCNC: 42 MG/DL
HDLC SERPL: 19.7 %
LDLC SERPL CALC-MCNC: ABNORMAL MG/DL
NONHDLC SERPL-MCNC: 171 MG/DL
POTASSIUM SERPL-SCNC: 4.1 MMOL/L
PROT SERPL-MCNC: 7.9 G/DL
SODIUM SERPL-SCNC: 138 MMOL/L
TRIGL SERPL-MCNC: 416 MG/DL

## 2018-09-06 PROCEDURE — 80061 LIPID PANEL: CPT

## 2018-09-06 PROCEDURE — 83036 HEMOGLOBIN GLYCOSYLATED A1C: CPT

## 2018-09-06 PROCEDURE — 36415 COLL VENOUS BLD VENIPUNCTURE: CPT | Mod: PO

## 2018-09-06 PROCEDURE — 80053 COMPREHEN METABOLIC PANEL: CPT

## 2018-10-05 ENCOUNTER — PATIENT OUTREACH (OUTPATIENT)
Dept: ADMINISTRATIVE | Facility: HOSPITAL | Age: 48
End: 2018-10-05

## 2018-10-05 NOTE — PROGRESS NOTES
Contacted patient regarding overdue eye exam    Patient has completed eye exam within the last 12 months    Completed eye exam at [facility]    Records will be sent to PCP

## 2018-11-02 DIAGNOSIS — E11.59 HYPERTENSION ASSOCIATED WITH DIABETES: ICD-10-CM

## 2018-11-02 DIAGNOSIS — E11.69 HYPERLIPIDEMIA ASSOCIATED WITH TYPE 2 DIABETES MELLITUS: ICD-10-CM

## 2018-11-02 DIAGNOSIS — E78.5 HYPERLIPIDEMIA ASSOCIATED WITH TYPE 2 DIABETES MELLITUS: ICD-10-CM

## 2018-11-02 DIAGNOSIS — E11.9 CONTROLLED TYPE 2 DIABETES MELLITUS WITHOUT COMPLICATION, WITHOUT LONG-TERM CURRENT USE OF INSULIN: ICD-10-CM

## 2018-11-02 DIAGNOSIS — I15.2 HYPERTENSION ASSOCIATED WITH DIABETES: ICD-10-CM

## 2018-11-02 RX ORDER — ATORVASTATIN CALCIUM 40 MG/1
40 TABLET, FILM COATED ORAL DAILY
Qty: 30 TABLET | Refills: 0 | Status: SHIPPED | OUTPATIENT
Start: 2018-11-02 | End: 2019-01-21 | Stop reason: SDUPTHER

## 2018-11-02 RX ORDER — AMLODIPINE BESYLATE 2.5 MG/1
2.5 TABLET ORAL DAILY
Qty: 30 TABLET | Refills: 0 | Status: SHIPPED | OUTPATIENT
Start: 2018-11-02 | End: 2019-01-21 | Stop reason: SDUPTHER

## 2018-11-02 RX ORDER — METFORMIN HYDROCHLORIDE 500 MG/1
500 TABLET ORAL 2 TIMES DAILY WITH MEALS
Qty: 60 TABLET | Refills: 0 | Status: SHIPPED | OUTPATIENT
Start: 2018-11-02 | End: 2019-04-29 | Stop reason: SDUPTHER

## 2018-11-02 RX ORDER — LISINOPRIL 20 MG/1
20 TABLET ORAL DAILY
Qty: 30 TABLET | Refills: 0 | Status: SHIPPED | OUTPATIENT
Start: 2018-11-02 | End: 2019-01-21 | Stop reason: SDUPTHER

## 2019-01-14 ENCOUNTER — PATIENT OUTREACH (OUTPATIENT)
Dept: ADMINISTRATIVE | Facility: HOSPITAL | Age: 49
End: 2019-01-14

## 2019-01-14 NOTE — PROGRESS NOTES
[facility] is committed to your overall health and would to ensure that you are up to date on your recommended health testing. Junaid Koenig Jr, MD has found that you maybe due for the following:    Health Maintenance Due   Topic Date Due    Foot Exam  02/14/1979    Pneumococcal Vaccine (Medium Risk) (1 of 1 - PPSV23) 02/14/1988    Influenza Vaccine  08/01/2018           Also, Your last recorded [facility] blood pressure reading was elevated. Please schedule a nurse visit or doctor visit to have your blood pressure retaken. Please take any prescribed medication as directed the day of the appointment. Please bring in a home blood pressure machine to compare if you have one.     Please schedule these appointments at your earliest convenience by calling [phone]* or going to [email or web address]      .

## 2019-01-21 ENCOUNTER — TELEPHONE (OUTPATIENT)
Dept: FAMILY MEDICINE | Facility: CLINIC | Age: 49
End: 2019-01-21

## 2019-01-21 DIAGNOSIS — E11.59 HYPERTENSION ASSOCIATED WITH DIABETES: ICD-10-CM

## 2019-01-21 DIAGNOSIS — E78.5 HYPERLIPIDEMIA ASSOCIATED WITH TYPE 2 DIABETES MELLITUS: ICD-10-CM

## 2019-01-21 DIAGNOSIS — E11.69 HYPERLIPIDEMIA ASSOCIATED WITH TYPE 2 DIABETES MELLITUS: ICD-10-CM

## 2019-01-21 DIAGNOSIS — I15.2 HYPERTENSION ASSOCIATED WITH DIABETES: ICD-10-CM

## 2019-01-21 RX ORDER — LISINOPRIL 20 MG/1
20 TABLET ORAL DAILY
Qty: 30 TABLET | Refills: 0 | Status: SHIPPED | OUTPATIENT
Start: 2019-01-21 | End: 2019-04-29 | Stop reason: SDUPTHER

## 2019-01-21 RX ORDER — AMLODIPINE BESYLATE 2.5 MG/1
2.5 TABLET ORAL DAILY
Qty: 30 TABLET | Refills: 0 | Status: SHIPPED | OUTPATIENT
Start: 2019-01-21 | End: 2019-04-29 | Stop reason: SDUPTHER

## 2019-01-21 RX ORDER — ATORVASTATIN CALCIUM 40 MG/1
40 TABLET, FILM COATED ORAL DAILY
Qty: 30 TABLET | Refills: 0 | Status: SHIPPED | OUTPATIENT
Start: 2019-01-21 | End: 2019-04-29 | Stop reason: SDUPTHER

## 2019-01-21 NOTE — TELEPHONE ENCOUNTER
Medications refilled for 30 days.  Patient has not been seen in the office in more than 1 year.  He did schedule return visits in July and November, then canceled these appointments.  He also no showed for a lab appointment in July.  He eventually had laboratory investigation in September, but again failed to follow-up here in the office.  If patient does not return to clinic within the next 30 days, I will assume that he no longer wishes to receive care at this office.

## 2019-03-01 DIAGNOSIS — Z12.11 COLON CANCER SCREENING: ICD-10-CM

## 2019-03-29 ENCOUNTER — PATIENT MESSAGE (OUTPATIENT)
Dept: ADMINISTRATIVE | Facility: HOSPITAL | Age: 49
End: 2019-03-29

## 2019-03-29 ENCOUNTER — TELEPHONE (OUTPATIENT)
Dept: ADMINISTRATIVE | Facility: HOSPITAL | Age: 49
End: 2019-03-29

## 2019-03-29 NOTE — TELEPHONE ENCOUNTER
Patient was contacted to scheduled visit with Junaid Koenig Jr, MD. Attempt unsuccessful,will follow up with patient at a later time.     LEFT MESSAGE FOR PATIENT TO CONTACT OFFICE TO SCHEDULE ANNUAL VISIT    ALSO SENT PORTAL MESSAGE IN EFFORTS TO REACH PATIENT.

## 2019-04-27 DIAGNOSIS — E11.59 HYPERTENSION ASSOCIATED WITH DIABETES: ICD-10-CM

## 2019-04-27 DIAGNOSIS — I15.2 HYPERTENSION ASSOCIATED WITH DIABETES: ICD-10-CM

## 2019-04-27 DIAGNOSIS — E11.69 HYPERLIPIDEMIA ASSOCIATED WITH TYPE 2 DIABETES MELLITUS: ICD-10-CM

## 2019-04-27 DIAGNOSIS — E11.9 CONTROLLED TYPE 2 DIABETES MELLITUS WITHOUT COMPLICATION, WITHOUT LONG-TERM CURRENT USE OF INSULIN: ICD-10-CM

## 2019-04-27 DIAGNOSIS — E78.5 HYPERLIPIDEMIA ASSOCIATED WITH TYPE 2 DIABETES MELLITUS: ICD-10-CM

## 2019-04-27 RX ORDER — AMLODIPINE BESYLATE 2.5 MG/1
2.5 TABLET ORAL DAILY
Qty: 30 TABLET | Refills: 0 | Status: CANCELLED | OUTPATIENT
Start: 2019-04-27 | End: 2019-05-27

## 2019-04-27 RX ORDER — METFORMIN HYDROCHLORIDE 500 MG/1
500 TABLET ORAL 2 TIMES DAILY WITH MEALS
Qty: 60 TABLET | Refills: 0 | Status: CANCELLED | OUTPATIENT
Start: 2019-04-27 | End: 2020-04-26

## 2019-04-27 RX ORDER — ATORVASTATIN CALCIUM 40 MG/1
40 TABLET, FILM COATED ORAL DAILY
Qty: 30 TABLET | Refills: 0 | Status: CANCELLED | OUTPATIENT
Start: 2019-04-27 | End: 2020-04-26

## 2019-04-27 RX ORDER — LISINOPRIL 20 MG/1
20 TABLET ORAL DAILY
Qty: 30 TABLET | Refills: 0 | Status: CANCELLED | OUTPATIENT
Start: 2019-04-27 | End: 2020-04-26

## 2019-04-29 ENCOUNTER — PATIENT MESSAGE (OUTPATIENT)
Dept: ADMINISTRATIVE | Facility: OTHER | Age: 49
End: 2019-04-29

## 2019-04-29 ENCOUNTER — OFFICE VISIT (OUTPATIENT)
Dept: FAMILY MEDICINE | Facility: CLINIC | Age: 49
End: 2019-04-29
Attending: FAMILY MEDICINE
Payer: COMMERCIAL

## 2019-04-29 VITALS
OXYGEN SATURATION: 97 % | SYSTOLIC BLOOD PRESSURE: 180 MMHG | HEIGHT: 66 IN | WEIGHT: 222.63 LBS | BODY MASS INDEX: 35.78 KG/M2 | DIASTOLIC BLOOD PRESSURE: 110 MMHG | HEART RATE: 84 BPM

## 2019-04-29 DIAGNOSIS — E11.69 HYPERLIPIDEMIA ASSOCIATED WITH TYPE 2 DIABETES MELLITUS: ICD-10-CM

## 2019-04-29 DIAGNOSIS — I15.2 HYPERTENSION ASSOCIATED WITH DIABETES: ICD-10-CM

## 2019-04-29 DIAGNOSIS — E11.59 HYPERTENSION ASSOCIATED WITH DIABETES: ICD-10-CM

## 2019-04-29 DIAGNOSIS — E11.9 CONTROLLED TYPE 2 DIABETES MELLITUS WITHOUT COMPLICATION, WITHOUT LONG-TERM CURRENT USE OF INSULIN: Primary | ICD-10-CM

## 2019-04-29 DIAGNOSIS — E78.5 HYPERLIPIDEMIA ASSOCIATED WITH TYPE 2 DIABETES MELLITUS: ICD-10-CM

## 2019-04-29 DIAGNOSIS — R06.81 WITNESSED APNEIC SPELLS: ICD-10-CM

## 2019-04-29 DIAGNOSIS — R06.83 SNORING: ICD-10-CM

## 2019-04-29 PROCEDURE — 3008F PR BODY MASS INDEX (BMI) DOCUMENTED: ICD-10-PCS | Mod: CPTII,S$GLB,, | Performed by: FAMILY MEDICINE

## 2019-04-29 PROCEDURE — 3080F PR MOST RECENT DIASTOLIC BLOOD PRESSURE >= 90 MM HG: ICD-10-PCS | Mod: CPTII,S$GLB,, | Performed by: FAMILY MEDICINE

## 2019-04-29 PROCEDURE — 99999 PR PBB SHADOW E&M-EST. PATIENT-LVL III: CPT | Mod: PBBFAC,,, | Performed by: FAMILY MEDICINE

## 2019-04-29 PROCEDURE — 99214 OFFICE O/P EST MOD 30 MIN: CPT | Mod: S$GLB,,, | Performed by: FAMILY MEDICINE

## 2019-04-29 PROCEDURE — 3077F PR MOST RECENT SYSTOLIC BLOOD PRESSURE >= 140 MM HG: ICD-10-PCS | Mod: CPTII,S$GLB,, | Performed by: FAMILY MEDICINE

## 2019-04-29 PROCEDURE — 3044F HG A1C LEVEL LT 7.0%: CPT | Mod: CPTII,S$GLB,, | Performed by: FAMILY MEDICINE

## 2019-04-29 PROCEDURE — 99999 PR PBB SHADOW E&M-EST. PATIENT-LVL III: ICD-10-PCS | Mod: PBBFAC,,, | Performed by: FAMILY MEDICINE

## 2019-04-29 PROCEDURE — 3044F PR MOST RECENT HEMOGLOBIN A1C LEVEL <7.0%: ICD-10-PCS | Mod: CPTII,S$GLB,, | Performed by: FAMILY MEDICINE

## 2019-04-29 PROCEDURE — 99214 PR OFFICE/OUTPT VISIT, EST, LEVL IV, 30-39 MIN: ICD-10-PCS | Mod: S$GLB,,, | Performed by: FAMILY MEDICINE

## 2019-04-29 PROCEDURE — 3008F BODY MASS INDEX DOCD: CPT | Mod: CPTII,S$GLB,, | Performed by: FAMILY MEDICINE

## 2019-04-29 PROCEDURE — 3077F SYST BP >= 140 MM HG: CPT | Mod: CPTII,S$GLB,, | Performed by: FAMILY MEDICINE

## 2019-04-29 PROCEDURE — 3080F DIAST BP >= 90 MM HG: CPT | Mod: CPTII,S$GLB,, | Performed by: FAMILY MEDICINE

## 2019-04-29 RX ORDER — AMLODIPINE BESYLATE 2.5 MG/1
2.5 TABLET ORAL DAILY
Qty: 30 TABLET | Refills: 0 | Status: SHIPPED | OUTPATIENT
Start: 2019-04-29 | End: 2019-05-31 | Stop reason: DRUGHIGH

## 2019-04-29 RX ORDER — ATORVASTATIN CALCIUM 40 MG/1
40 TABLET, FILM COATED ORAL DAILY
Qty: 30 TABLET | Refills: 0 | Status: SHIPPED | OUTPATIENT
Start: 2019-04-29 | End: 2019-06-03 | Stop reason: SDUPTHER

## 2019-04-29 RX ORDER — METFORMIN HYDROCHLORIDE 500 MG/1
500 TABLET ORAL 2 TIMES DAILY WITH MEALS
Qty: 60 TABLET | Refills: 0 | Status: SHIPPED | OUTPATIENT
Start: 2019-04-29 | End: 2019-05-16

## 2019-04-29 RX ORDER — LISINOPRIL 20 MG/1
20 TABLET ORAL DAILY
Qty: 30 TABLET | Refills: 0 | Status: SHIPPED | OUTPATIENT
Start: 2019-04-29 | End: 2019-05-16 | Stop reason: ALTCHOICE

## 2019-04-29 NOTE — PATIENT INSTRUCTIONS
Jose,     We are always striving for excellence. Should you receive a patient experience survey in the mail, we would appreciate if you would take a few moments to give us your feedback. These surveys let us know our strengths as well as areas of opportunity for improvement to better serve you.    Thank you for your time,  Renetta Bass LPN    Test results will be communicated to you via : [facility], Telephone or Letter.   If you have not received test results in one week, please contact the clinic at   [phone].

## 2019-04-29 NOTE — PROGRESS NOTES
"Subjective:       Patient ID: Tracy Gabriel is a 49 y.o. male who returns to the office today for his 1st visit with me since November, 2017.  He has a history of type 2 diabetes as long overdue for his follow-up visits; he did schedule then canceled 2 appointments with this office in last 9 months.  His last laboratory investigation was in September, 2018; at that time, diabetes was well controlled, with uncontrolled hyperlipidemia.  I have also referred him for evaluation by sleep medicine; he has scheduled cancel 2 appointments with that department as well.    Chief Complaint: Annual Exam    HPI     His blood pressure is elevated today, as the patient has not had any medication for the last 3 days.  I ensured in that refills would be sent to his pharmacy.  He admits noncompliance with exercise and diet some however, he denies any noncompliance with his medication regimen.  He is agreeable to enroll in the digital medicine clinics.  He is also anxious to reschedule his visit with sleep medicine, as he states that his wife will has been complaining lately."      Patient Active Problem List   Diagnosis    Controlled type 2 diabetes mellitus without complication, without long-term current use of insulin    Hyperlipidemia associated with type 2 diabetes mellitus    Snoring    Witnessed apneic spells    Hypertension associated with diabetes       Current Outpatient Medications:     amLODIPine (NORVASC) 2.5 MG tablet, Take 1 tablet (2.5 mg total) by mouth once daily., Disp: 30 tablet, Rfl: 0    atorvastatin (LIPITOR) 40 MG tablet, Take 1 tablet (40 mg total) by mouth once daily., Disp: 30 tablet, Rfl: 0    lisinopril (PRINIVIL,ZESTRIL) 20 MG tablet, Take 1 tablet (20 mg total) by mouth once daily., Disp: 30 tablet, Rfl: 0    metFORMIN (GLUCOPHAGE) 500 MG tablet, Take 1 tablet (500 mg total) by mouth 2 (two) times daily with meals., Disp: 60 tablet, Rfl: 0    ONETOUCH DELICA LANCETS 30 gauge Misc, 1 " "lancet by Misc.(Non-Drug; Combo Route) route once daily., Disp: 25 each, Rfl: 11    ONETOUCH VERIO Strp, 1 strip by Misc.(Non-Drug; Combo Route) route once daily., Disp: 25 strip, Rfl: 11    The following portions of the patient's history were reviewed and updated as appropriate: allergies, past family history, past medical history, past social history and past surgical history.    Review of Systems   Constitutional: Negative for fatigue and unexpected weight change.   HENT: Negative for ear discharge, ear pain, hearing loss, tinnitus and voice change.    Eyes: Negative for pain.   Respiratory: Negative for cough and shortness of breath.    Cardiovascular: Negative for chest pain, palpitations and leg swelling.   Gastrointestinal: Negative for abdominal pain, blood in stool, constipation, diarrhea, nausea and vomiting.   Genitourinary: Negative for decreased urine volume, difficulty urinating, dysuria, enuresis, frequency, hematuria and urgency.   Musculoskeletal: Negative for arthralgias, back pain and myalgias.   Skin: Negative for rash.   Neurological: Negative for dizziness, weakness, light-headedness and headaches.   Hematological: Does not bruise/bleed easily.   Psychiatric/Behavioral: Negative for dysphoric mood and sleep disturbance. The patient is not nervous/anxious.          Objective:      BP (!) 180/110 (BP Location: Left arm, Patient Position: Sitting, BP Method: Large (Manual))   Pulse 84   Ht 5' 6" (1.676 m)   Wt 101 kg (222 lb 9.6 oz)   SpO2 97%   BMI 35.93 kg/m²     Physical Exam   Constitutional: He is oriented to person, place, and time. He appears well-developed and well-nourished. He is cooperative.   HENT:   Head: Normocephalic and atraumatic.   Right Ear: External ear normal.   Left Ear: External ear normal.   Nose: Nose normal.   Mouth/Throat: Oropharynx is clear and moist and mucous membranes are normal. No oropharyngeal exudate.   Eyes: Conjunctivae are normal. No scleral icterus. "   Neck: Neck supple. No JVD present. Carotid bruit is not present. No thyromegaly present.   Cardiovascular: Normal rate, regular rhythm, normal heart sounds and normal pulses. Exam reveals no gallop and no friction rub.   No murmur heard.  Pulmonary/Chest: Effort normal and breath sounds normal. He has no wheezes. He has no rhonchi. He has no rales.   Abdominal: Soft. Bowel sounds are normal. He exhibits no distension and no mass. There is no splenomegaly or hepatomegaly. There is no tenderness.   Musculoskeletal: Normal range of motion. He exhibits no edema or tenderness.   Lymphadenopathy:     He has no cervical adenopathy.     He has no axillary adenopathy.   Neurological: He is alert and oriented to person, place, and time. He has normal strength and normal reflexes. No cranial nerve deficit or sensory deficit. Coordination normal.   Skin: Skin is warm and dry.   Psychiatric: He has a normal mood and affect.   Vitals reviewed.        Assessment:       1. Controlled type 2 diabetes mellitus without complication, without long-term current use of insulin    2. Hypertension associated with diabetes    3. Hyperlipidemia associated with type 2 diabetes mellitus    4. Snoring    5. Witnessed apneic spells        Plan:       Long discussion with patient concerning the need to take control of his risk factors.  Reiterated the importance of correcting sleep issue in treating his metabolic disturbances.  Laboratory investigation (see orders).  Ensure follow-up with Sleep Medicine as soon as possible.  Enroll in digital medicine clinics.  We will call the patient with results & make further recommendations at that time.      Orders Placed This Encounter    Comprehensive metabolic panel    Hemoglobin A1c    Lipid panel    Diabetes Digital Medicine (DDMP) Enrollment Order    Hypertension Digital Medicine (HDMP) Enrollment Order    metFORMIN (GLUCOPHAGE) 500 MG tablet    atorvastatin (LIPITOR) 40 MG tablet     "amLODIPine (NORVASC) 2.5 MG tablet    lisinopril (PRINIVIL,ZESTRIL) 20 MG tablet    Diabetes Digital Medicine (DDMP): Assign Onboarding Questionnaires    Hypertension Digital Medicine (HDMP): Assign Onboarding Questionnaires         "This note will not be shared with the patient."  "

## 2019-04-30 DIAGNOSIS — E11.9 TYPE 2 DIABETES MELLITUS: ICD-10-CM

## 2019-05-01 ENCOUNTER — LAB VISIT (OUTPATIENT)
Dept: LAB | Facility: HOSPITAL | Age: 49
End: 2019-05-01
Attending: FAMILY MEDICINE
Payer: COMMERCIAL

## 2019-05-01 ENCOUNTER — PATIENT MESSAGE (OUTPATIENT)
Dept: FAMILY MEDICINE | Facility: CLINIC | Age: 49
End: 2019-05-01

## 2019-05-01 DIAGNOSIS — I15.2 HYPERTENSION ASSOCIATED WITH DIABETES: ICD-10-CM

## 2019-05-01 DIAGNOSIS — E78.5 HYPERLIPIDEMIA ASSOCIATED WITH TYPE 2 DIABETES MELLITUS: ICD-10-CM

## 2019-05-01 DIAGNOSIS — E11.59 HYPERTENSION ASSOCIATED WITH DIABETES: ICD-10-CM

## 2019-05-01 DIAGNOSIS — E11.9 CONTROLLED TYPE 2 DIABETES MELLITUS WITHOUT COMPLICATION, WITHOUT LONG-TERM CURRENT USE OF INSULIN: ICD-10-CM

## 2019-05-01 DIAGNOSIS — E11.69 HYPERLIPIDEMIA ASSOCIATED WITH TYPE 2 DIABETES MELLITUS: ICD-10-CM

## 2019-05-01 DIAGNOSIS — Z91.89 FRAMINGHAM CARDIAC RISK 10-20% IN NEXT 10 YEARS: ICD-10-CM

## 2019-05-01 LAB
ALBUMIN SERPL BCP-MCNC: 4 G/DL (ref 3.5–5.2)
ALP SERPL-CCNC: 61 U/L (ref 55–135)
ALT SERPL W/O P-5'-P-CCNC: 46 U/L (ref 10–44)
ANION GAP SERPL CALC-SCNC: 11 MMOL/L (ref 8–16)
AST SERPL-CCNC: 32 U/L (ref 10–40)
BILIRUB SERPL-MCNC: 0.6 MG/DL (ref 0.1–1)
BUN SERPL-MCNC: 12 MG/DL (ref 6–20)
CALCIUM SERPL-MCNC: 9.4 MG/DL (ref 8.7–10.5)
CHLORIDE SERPL-SCNC: 105 MMOL/L (ref 95–110)
CHOLEST SERPL-MCNC: 230 MG/DL (ref 120–199)
CHOLEST/HDLC SERPL: 5.2 {RATIO} (ref 2–5)
CO2 SERPL-SCNC: 23 MMOL/L (ref 23–29)
CREAT SERPL-MCNC: 0.8 MG/DL (ref 0.5–1.4)
EST. GFR  (AFRICAN AMERICAN): >60 ML/MIN/1.73 M^2
EST. GFR  (NON AFRICAN AMERICAN): >60 ML/MIN/1.73 M^2
ESTIMATED AVG GLUCOSE: 134 MG/DL (ref 68–131)
GLUCOSE SERPL-MCNC: 135 MG/DL (ref 70–110)
HBA1C MFR BLD HPLC: 6.3 % (ref 4–5.6)
HDLC SERPL-MCNC: 44 MG/DL (ref 40–75)
HDLC SERPL: 19.1 % (ref 20–50)
LDLC SERPL CALC-MCNC: ABNORMAL MG/DL (ref 63–159)
NONHDLC SERPL-MCNC: 186 MG/DL
POTASSIUM SERPL-SCNC: 4 MMOL/L (ref 3.5–5.1)
PROT SERPL-MCNC: 7.3 G/DL (ref 6–8.4)
SODIUM SERPL-SCNC: 139 MMOL/L (ref 136–145)
TRIGL SERPL-MCNC: 506 MG/DL (ref 30–150)

## 2019-05-01 PROCEDURE — 83036 HEMOGLOBIN GLYCOSYLATED A1C: CPT

## 2019-05-01 PROCEDURE — 80061 LIPID PANEL: CPT

## 2019-05-01 PROCEDURE — 36415 COLL VENOUS BLD VENIPUNCTURE: CPT | Mod: PO

## 2019-05-01 PROCEDURE — 80053 COMPREHEN METABOLIC PANEL: CPT

## 2019-05-02 ENCOUNTER — PATIENT MESSAGE (OUTPATIENT)
Dept: FAMILY MEDICINE | Facility: CLINIC | Age: 49
End: 2019-05-02

## 2019-05-08 ENCOUNTER — PATIENT OUTREACH (OUTPATIENT)
Dept: OTHER | Facility: OTHER | Age: 49
End: 2019-05-08

## 2019-05-08 NOTE — LETTER
May 8, 2019     Tracy Gabriel  [address] [address]       Dear Tracy,    Welcome to [facility]! Our goal is to make care effective, proactive and convenient by using data you send us from home to better treat your chronic conditions.          My name is Wendi Kennedy, and I am your dedicated Digital Medicine clinician. As an expert in medication management, I will help ensure that the medications you are taking continue to provide the intended benefits and help you reach your goals. You can reach me directly at [phone] or by sending me a message directly through your [facility] account.      I am Valerie Aggarwal and I will be your health . My job is to help you identify lifestyle changes to improve your disease control. We will talk about nutrition, exercise, and other ways you may be able to adjust your current habits to better your health. Additionally, we will help ensure you are completing the tests and screenings that are necessary to help manage your conditions. You can reach me directly at [phone] or by sending me a message directly through your [facility] account.    Most importantly, YOU are at the center of this team. Together, we will work to improve your overall health and encourage you to meet your goals for a healthier lifestyle.     What we expect from YOU:  · Please take frequent home blood sugar measurements according to the frequency your physician and Digital Medicine care team specify. It is important that your team see both fasting and after meal readings.      Be available to receive phone calls or [facility] messages, when appropriate, from your care team. Please let us know if there are any specific days or times that work best for us to reach you via phone.     Complete routine tests and screenings. Dont worry, we will help keep you on track!           What you should expect from your Digital Medicine Care Team:   We will work with  you to create a personalized plan of care and provide you with encouragement and education, including regarding lifestyle changes, that could help you manage your disease states.     We will adjust your current medications, if needed, and continue to monitor your long-term progress.     We will provide you and your physician with monthly progress reports after you have been in the program for more than 30 days.     We will send you reminders through [facility] and text messages to help ensure you do not miss any testing deadlines to help manage your disease states.    You will be able to reach us by phone or through your [facility] account by clicking our names under Care Team on the right side of the home screen.    I look forward to working with you to achieve your blood pressure goals!    We look forward to working with you to help manage your health,    Sincerely,    Your Digital Medicine Team    Please visit our websites to learn more:   · Diabetes: [email or web address]      Remember, we are not available for emergencies. If you have an emergency, please contact your doctors office directly or call [facility] ([phone] or [phone]) or 911.    Diabetes: We want help you get important tests and screenings done regularly to assure that your health needs are met. We have put a new system in place, called CareTouch that will help us improve how we monitor and reach out to you about the following lab tests that you will need to help manage your diabetes.  · Hemoglobin A1c testing (Frequency: Every 3 to 6 months, dependent on A1c goal)  · Nephropathy Assessment, generally urine micro albumin testing (Frequency: Yearly)  · Eye exam through a quick 30-minute Eye Photo Exam (Frequency: 1-2 Years, depending on result)    When necessary you can come in to one of the labs on the attached page any weekday between 10:30 am and 4:00 pm to have your tests done prior to their due date. Tell  the  you received a CareTouch letter, or just look for the CareTouch sign.

## 2019-05-08 NOTE — PROGRESS NOTES
Digital Medicine Enrollment Call    Introduced Mr. Tracy Gabriel to Digital Medicine.     Discussed program expectations and requirements.    Introduced digital medicine care team.     Reviewed the importance of self-monitoring for digital medicine participation.     Reviewed that the Digital Medicine team is not available for emergencies and instructed the patient to call 911 or [facility] ([phone] or [phone]) if one arises.    Pt reports that he is also participating in the HDMP. Pt advised there has been no BP data transmitted to the digital medicine team. Pt advised to sign into [facility] account to try and resolve issue. Issue not resolved. Call transferred to IT support.          Pt request to return my call later this afternoon to complete enrollment call.        Last 6 Patient Entered Readings                                          Most Recent A1c: 6.3% on 5/1/2019  (Goal: 7%)     Recent Readings 5/8/2019 5/7/2019    Blood Glucose (mg/dL) 150 158

## 2019-05-14 ENCOUNTER — PATIENT OUTREACH (OUTPATIENT)
Dept: OTHER | Facility: OTHER | Age: 49
End: 2019-05-14

## 2019-05-14 NOTE — PROGRESS NOTES
Last 5 Patient Entered Readings                                      Current 30 Day Average: 155/101     Recent Readings 5/14/2019 5/14/2019 5/14/2019 5/13/2019 5/13/2019    SBP (mmHg) 135 164 160 138 137    DBP (mmHg) 84 98 113 88 85    Pulse 88 78 72 95 98          Last 6 Patient Entered Readings                                          Most Recent A1c: 6.3% on 5/1/2019  (Goal: 7%)     Recent Readings 5/14/2019 5/14/2019 5/13/2019 5/13/2019 5/12/2019    Blood Glucose (mg/dL) 186 158 136 158 136        Digital Medicine: Health  Introduction    Introduced Mr. Tracy Gabriel to Digital Medicine. Discussed health  role and recommended lifestyle modifications.    Patient has not been waiting after meds to take his BP medicine.  I reviewed technique and timing of taking his BP and will monitor readings to see if it makes a difference.    Lifestyle Assessment:  Current Dietary Habits(i.e. low sodium, food labels, dining out):Patient states that he currently doesn't use salt at home and he tries to make low sodium options when dining out.  He states that he drinks only water other than a cup of coffee in the morning.    Exercise:Patient states that he just joined a gym, but hasn't really started going yet.  He reports that he gets 10,000 steps in every day, but it is not brisk walking      Alcohol/Tobacco:Patient denies any tobacco use.  He states that he has about 20 alcoholic drinks per week.  I reviewed recommendation of no more than two drinks per day.    Medication Adherence: has been compliant with the medicaiton regimen     Other goals:    Reviewed AHA/AACE recommendations:  Limit sodium intake to <2000mg/day  Recommended CHO intake, 45-65% of daily caloric intake  Perform 150 minutes of physical activity per week    Reviewed the importance of self-monitoring, medication adherence, and that the health  can be used as a resource for lifestyle modifications to help reduce or maintain a healthy  lifestyle.  Reviewed that the Digital Medicine team is not available for emergencies and instructed the patient to call 911 or [facility] ([phone] or [phone]) if one arises.

## 2019-05-16 ENCOUNTER — PATIENT OUTREACH (OUTPATIENT)
Dept: OTHER | Facility: OTHER | Age: 49
End: 2019-05-16

## 2019-05-16 DIAGNOSIS — E11.59 HYPERTENSION ASSOCIATED WITH DIABETES: ICD-10-CM

## 2019-05-16 DIAGNOSIS — I15.2 HYPERTENSION ASSOCIATED WITH DIABETES: ICD-10-CM

## 2019-05-16 DIAGNOSIS — E11.9 TYPE 2 DIABETES MELLITUS WITHOUT COMPLICATION, WITHOUT LONG-TERM CURRENT USE OF INSULIN: Primary | ICD-10-CM

## 2019-05-16 RX ORDER — VALSARTAN 160 MG/1
160 TABLET ORAL DAILY
Qty: 30 TABLET | Refills: 1 | Status: SHIPPED | OUTPATIENT
Start: 2019-05-16 | End: 2019-06-21 | Stop reason: DRUGHIGH

## 2019-05-16 RX ORDER — METFORMIN HYDROCHLORIDE 500 MG/1
1000 TABLET, EXTENDED RELEASE ORAL
Qty: 60 TABLET | Refills: 1 | Status: SHIPPED | OUTPATIENT
Start: 2019-05-16 | End: 2019-08-12 | Stop reason: SDUPTHER

## 2019-05-16 NOTE — PROGRESS NOTES
HPI:  PMH: apneic, HLD, HTN, DM2     Patient denies s/s of hypotension (lightheadedness, dizziness, nausea, fatigue) associated with low readings. Instructed patient to inform me if this occurs, patient confirms understanding.    Patient denies s/s of hypertension (SOB, CP, severe headaches, changes in vision) associated with high readings. Instructed patient to go to the ED if BP >180/110 and accompanied by hypertensive s/s, patient confirms understanding.    Patient denies s/s or episodes of hypoglycemia (weakness, dizziness, hunger, shakiness, nausea, headache, heart palpitations, sweating, fatigue, anxiety, etc.).    Patient denies s/s of hyperglycemia (headache, increased thirst, increased urination, fatigue, blurred vision).    Assessment:  Patient's current 30-day average of 153/98 is above goal of <130/80 mmHg based on ACC/AHA HTN guidelines.     Last 5 Patient Entered Readings                                      Current 30 Day Average: 153/98     Recent Readings 5/16/2019 5/16/2019 5/15/2019 5/15/2019 5/15/2019    SBP (mmHg) 145 147 143 141 130    DBP (mmHg) 93 89 79 92 83    Pulse 76 80 88 94 103        Diabetes is controlled based on patient's last A1C. SMBG readings reviewed and are   Last 6 Patient Entered Readings                                          Most Recent A1c: 6.3% on 5/1/2019  (Goal: 7%)     Recent Readings 5/16/2019 5/15/2019 5/15/2019 5/14/2019 5/14/2019    Blood Glucose (mg/dL) 135 165 159 186 158        Health maintenance is not up to date. Patient is overdue for a foot exam, colonoscopy. Sleep study 5/28/19.     Current medication regimen:  Hypertension Medications             amLODIPine (NORVASC) 2.5 MG tablet Take 1 tablet (2.5 mg total) by mouth once daily.    lisinopril (PRINIVIL,ZESTRIL) 20 MG tablet Take 1 tablet (20 mg total) by mouth once daily.        Diabetes Medications             metFORMIN (GLUCOPHAGE) 500 MG tablet Take 1 tablet (500 mg total) by mouth 2 (two) times  daily with meals.        Patient endorses skipping HTN meds about once a week and metformin PM dose multiple times/week.     Plan:    Stop lisinopril and start valsartan 160 mg QD.  Stop metformin 500 mg BID, initiate metformin 100 mg XR QD to assist with compliance and fasting glucose.   Patients health , Valerie Aggarwal, will be following up every 3-4 weeks.   I will continue to monitor regularly and will follow-up in 2-3 weeks, sooner if blood pressure or blood glucose begins to trend upward or downward.     Patient has my contact information and knows to call with any concerns or clinical changes.

## 2019-05-28 ENCOUNTER — OFFICE VISIT (OUTPATIENT)
Dept: SLEEP MEDICINE | Facility: CLINIC | Age: 49
End: 2019-05-28
Payer: COMMERCIAL

## 2019-05-28 VITALS
BODY MASS INDEX: 35.68 KG/M2 | WEIGHT: 222 LBS | HEART RATE: 87 BPM | SYSTOLIC BLOOD PRESSURE: 174 MMHG | HEIGHT: 66 IN | DIASTOLIC BLOOD PRESSURE: 109 MMHG

## 2019-05-28 DIAGNOSIS — G47.30 SLEEP APNEA, UNSPECIFIED TYPE: Primary | ICD-10-CM

## 2019-05-28 PROCEDURE — 3077F SYST BP >= 140 MM HG: CPT | Mod: CPTII,S$GLB,, | Performed by: NURSE PRACTITIONER

## 2019-05-28 PROCEDURE — 99204 PR OFFICE/OUTPT VISIT, NEW, LEVL IV, 45-59 MIN: ICD-10-PCS | Mod: S$GLB,,, | Performed by: NURSE PRACTITIONER

## 2019-05-28 PROCEDURE — 99204 OFFICE O/P NEW MOD 45 MIN: CPT | Mod: S$GLB,,, | Performed by: NURSE PRACTITIONER

## 2019-05-28 PROCEDURE — 99999 PR PBB SHADOW E&M-EST. PATIENT-LVL III: ICD-10-PCS | Mod: PBBFAC,,, | Performed by: NURSE PRACTITIONER

## 2019-05-28 PROCEDURE — 3077F PR MOST RECENT SYSTOLIC BLOOD PRESSURE >= 140 MM HG: ICD-10-PCS | Mod: CPTII,S$GLB,, | Performed by: NURSE PRACTITIONER

## 2019-05-28 PROCEDURE — 3080F PR MOST RECENT DIASTOLIC BLOOD PRESSURE >= 90 MM HG: ICD-10-PCS | Mod: CPTII,S$GLB,, | Performed by: NURSE PRACTITIONER

## 2019-05-28 PROCEDURE — 3080F DIAST BP >= 90 MM HG: CPT | Mod: CPTII,S$GLB,, | Performed by: NURSE PRACTITIONER

## 2019-05-28 PROCEDURE — 3008F BODY MASS INDEX DOCD: CPT | Mod: CPTII,S$GLB,, | Performed by: NURSE PRACTITIONER

## 2019-05-28 PROCEDURE — 3008F PR BODY MASS INDEX (BMI) DOCUMENTED: ICD-10-PCS | Mod: CPTII,S$GLB,, | Performed by: NURSE PRACTITIONER

## 2019-05-28 PROCEDURE — 99999 PR PBB SHADOW E&M-EST. PATIENT-LVL III: CPT | Mod: PBBFAC,,, | Performed by: NURSE PRACTITIONER

## 2019-05-28 NOTE — PATIENT INSTRUCTIONS
Obstructive Sleep Apnea  Obstructive sleep apnea is a condition that causes your air passages to become narrowed or blocked during sleep. As a result, breathing stops for short periods. Your body wakes up enough for breathing to begin again, though you don't remember it. The cycle of stopped breathing and brief awakenings can repeat dozens of times a night. This prevents the body from getting to the deeper stages of sleep that are needed for good rest and may cause your body's oxygen level to fall.  Signs of sleep apnea include loud snoring, noisy breathing, and gasping sounds during sleep. Daytime symptoms include waking up tired after a full night's sleep, waking up with headaches, feeling very sleepy or falling asleep during the day, and having problems with memory or concentration.  Risk factors for sleep apnea include:  · Being overweight  · Being a man, or a woman in menopause  · Smoking  · Using alcohol or sedating medicines  · Having enlarged structures in the nose or throat  Home care  Lifestyle changes that can help treat snoring and sleep apnea include the following:  · If you are overweight, lose weight. Talk to your healthcare provider about a weight-loss plan for you.  · Avoid alcohol for 3 to 4 hours before bedtime. Avoid sedating medications. Ask your healthcare provider about the medicines you take.  · If you smoke, talk to your healthcare provider about ways to quit.  · Sleep on your side. This can help prevent gravity from pulling relaxed throat tissues into your breathing passages.  · If you have allergies or sinus problems that block your nose, ask your healthcare provider for help.  Follow-up care  Follow up with your healthcare provider, or as advised. A diagnosis of sleep apnea is made with a sleep study. Your healthcare provider can tell you more about this test.  When to seek medical advice  Sleep apnea can make you more likely to have certain health problems. These include high blood  pressure, heart attack, stroke, and sexual dysfunction. If you have sleep apnea, talk to your healthcare provider about the best treatments for you.  Date Last Reviewed: 4/1/2017  © 0302-1484 [employer]. [address] [place] [address], [place] [address]. All rights reserved. This information is not intended as a substitute for professional medical care. Always follow your healthcare professional's instructions.      Sleep lab [phone]

## 2019-05-28 NOTE — PROGRESS NOTES
"Tracy Gabriel  was seen as a new patient, referred by Dr. Junaid Koenig,  for the evaluation of obstructive sleep apnea.     CHIEF COMPLAINT: Snoring, excessive daytime sleepiness    HISTORY OF PRESENT ILLNESS: Tracy Gabriel a 49 y.o. male presents for the evaluation of obstructive sleep apnea. He has never had a sleep study. +ongoing disruptive snoring. Snoring less when loses weight and side sleep. Tries to begin sleep on side. + witnessed apneic pauses. Denies am  Headaches or sinus congestion. Can't stay awake to finish movies or reading anymore. +frequent disrupted sleep  Digital BP monitoring program, higher since on new med 160mg qd  +anxiety    +symptoms nightly restless legs, no impact on sleep onset  Occurs mostly when lying down bedtime, jittery sensation "chai legs", they don't feel relaxed    EPWORTH SLEEPINESS SCALE 5/23/2019   Sitting and reading 3   Watching TV 3   Sitting, inactive in a public place (e.g. a theatre or a meeting) 3   As a passenger in a car for an hour without a break 2   Lying down to rest in the afternoon when circumstances permit 3   Sitting and talking to someone 0   Sitting quietly after a lunch without alcohol 2   In a car, while stopped for a few minutes in traffic 0   Total score 16     Sleep Clinic New Patient 5/23/2019   What time do you go to bed on a week day? (Give a range) 9pm   What time do you go to bed on a day off? (Give a range) 9pm   How long does it take you to fall asleep? (Give a range) 5 min   On average, how many times per night do you wake up? 4 or more   How long does it take you to fall back into sleep? (Give a range) A few minutes to a few hours   What time do you wake up to start your day on a week day? (Give a range) 5:30am   What time do you wake up to start your day on a day off? (Give a range) 6-7   What time do you get out of bed? (Give a range) 6-7   On average, how many hours do you sleep? 5   On average, how many naps do you take per " "day? If im home 1 or 2   Rate your sleep quality from 0 to 5 (0-poor, 5-great). 1   Have you experienced:  Weight gain?   How much weight have you lost or gained (in lbs.) in the last year? 30lbs   On average, how many times per night do you go to the bathroom?  1   Have you ever had a sleep study/CPAP machine/surgery for sleep apnea? No   Have you ever had a CPAP machine for sleep apnea? No   Have you ever had surgery for sleep apnea? No     FAMILY HISTORY: No known sleep disorders.     SOCIAL HISTORY: . 4-5 beer/day, [employer]    REVIEW OF SYSTEMS:  Sleep related symptoms as per [facility]  Sleep Clinic ROS  5/23/2019   Difficulty breathing through the nose?  No   Sore throat or dry mouth in the morning? Yes   Irregular or very fast heart beat?  No   Shortness of breath?  No   Acid reflux? Sometimes   Body aches and pains?  No   Morning headaches? No   Dizziness? No   Mood changes?  Yes   Do you exercise?  Sometimes   Do you feel like moving your legs a lot?  Yes     PHYSICAL EXAM:   BP (!) 174/109   Pulse 87   Ht 5' 6" (1.676 m)   Wt 100.7 kg (222 lb)   BMI 35.83 kg/m²   GENERAL: Obese body habitus, well groomed   HEENT: Conjunctivae are non-erythematous; Pupils equal, round, and reactive to light; Nose is symmetrical; Nasal mucosa is normal; Septum is midline; Inferior turbinates are normal; Nasal airflow is normal; Posterior pharynx is pink; Modified Mallampati: IV; Posterior palate is low; Tonsils not seen; Uvula is long/thick and pink;Tongue is long and broad; Dentition is fair; No TMJ tenderness; Jaw opening and protrusion without click and without discomfort.   NECK: Supple. Neck circumference is 18.25 inches. No thyromegaly. No palpable nodes.   SKIN: On face and neck: No abrasions, no rashes, no lesions. No subcutaneous nodules are palpable.   RESPIRATORY: Normal chest expansion and non-labored breathing at rest.   CARDIOVASCULAR:Regular rate and rhythm  EXTREMITIES: No edema. No clubbing. " No cyanosis. Station normal. Gait normal.   NEURO/PSYCH: Oriented to time, place and person. Normal attention span and concentration. Affect is full. Mood is normal.     HgBA1c 6.3 (5/1/19)    ASSESSMENT:     Unspecified Sleep Apnea, with symptoms of disruptive snoring, witnessed apneic pauses, un-refreshing disrupted sleep and excessive daytime sleepiness, with exam findings of a crowded oral airway, with medical comorbidities of obesity, hypertension, DM2 on metformin Warrants further investigation for untreated sleep apnea.     PLAN:   1. Home Sleep Study, discussed plan of care  2. Discussed etiology of JOSE and potential ramifications of untreated JOSE, including stroke, heart disease, HTN.  We discussed potential treatment options, which could include weight loss (10-15%), body positioning, continuous positive airway pressure (CPAP-definitive), mandibular advancement splint by dentist, [facility], or referral for surgical consideration.   3. [facility] results/plan of care. Encouraged weight loss efforts for potential improvement of JOSE and overall health benefits    Thank you for allowing me the opportunity to participate in the care of your patient

## 2019-05-28 NOTE — LETTER
May 28, 2019      Junaid Koenig Jr., MD  [address]  [address]  [facility] [address]           [facility] [address]  [address]  [facility] [address]  Phone: [phone]          Patient: Tracy Gabriel   MR Number: [id number]   YOB: 1970   Date of Visit: 5/28/2019       Dear Dr. Junaid Koenig Jr.:    Thank you for referring Tracy Gabriel to me for evaluation. Attached you will find relevant portions of my assessment and plan of care.    If you have questions, please do not hesitate to call me. I look forward to following Tracy Gabriel along with you.    Sincerely,    Natasha Parrish, NP    Enclosure  CC:  No Recipients    If you would like to receive this communication electronically, please contact [email or web address] or [phone] to request more information on Risk Ident Link access.    For providers and/or their staff who would like to refer a patient to [facility], please contact us through our one-stop-shop provider referral line, [facility], at [phone].    If you feel you have received this communication in error or would no longer like to receive these types of communications, please e-mail [email or web address]

## 2019-05-30 ENCOUNTER — PATIENT OUTREACH (OUTPATIENT)
Dept: OTHER | Facility: OTHER | Age: 49
End: 2019-05-30

## 2019-05-30 NOTE — PROGRESS NOTES
Last 5 Patient Entered Readings                                      Current 30 Day Average: 150/95     Recent Readings 5/29/2019 5/29/2019 5/28/2019 5/28/2019 5/27/2019    SBP (mmHg) 155 139 177 162 152    DBP (mmHg) 103 96 107 105 103    Pulse 99 105 73 91 74          Last 6 Patient Entered Readings                                          Most Recent A1c: 6.3% on 5/1/2019  (Goal: 7%)     Recent Readings 5/29/2019 5/28/2019 5/27/2019 5/26/2019 5/26/2019    Blood Glucose (mg/dL) 103 88 139 197 204        Digital Medicine: Health  Follow Up    Lifestyle Modifications:    1.Dietary Modifications (Sodium intake <2,000mg/day, food labels, dining out): Patient states he is trying to eat better and eat less. He feels like he is doing well at lowering the amount of salt he is eating.    2.Physical Activity: Patient joined a gym last week and has gone three times this week.  He states that he gets 10,000 - 15,000 steps per day at work.    3.Medication Therapy: Patient has been compliant with the medication regimen.    4.Patient has the following medication side effects/concerns: None reported.   (Frequency/Alleviating factors/Precipitating factors, etc.)     Follow up with . Tracy Gabriel completed. No further questions or concerns. Will continue to follow up to achieve health goals.

## 2019-05-31 ENCOUNTER — PATIENT OUTREACH (OUTPATIENT)
Dept: OTHER | Facility: OTHER | Age: 49
End: 2019-05-31

## 2019-05-31 DIAGNOSIS — I15.2 HYPERTENSION ASSOCIATED WITH DIABETES: Primary | ICD-10-CM

## 2019-05-31 DIAGNOSIS — E11.59 HYPERTENSION ASSOCIATED WITH DIABETES: Primary | ICD-10-CM

## 2019-05-31 RX ORDER — AMLODIPINE BESYLATE 5 MG/1
5 TABLET ORAL DAILY
Qty: 90 TABLET | Refills: 1 | Status: SHIPPED | OUTPATIENT
Start: 2019-05-31 | End: 2019-07-16 | Stop reason: DRUGHIGH

## 2019-05-31 NOTE — PROGRESS NOTES
HPI:  Last 5 Patient Entered Readings                                      Current 30 Day Average: 149/95     Recent Readings 5/31/2019 5/30/2019 5/30/2019 5/29/2019 5/29/2019    SBP (mmHg) 136 143 145 155 139    DBP (mmHg) 82 96 111 103 96    Pulse 85 89 92 99 105        Patient denies s/s of hypotension (lightheadedness, dizziness, nausea, fatigue) associated with low readings. Instructed patient to inform me if this occurs, patient confirms understanding.    Patient denies s/s of hypertension (SOB, CP, severe headaches, changes in vision) associated with high readings. Instructed patient to go to the ED if BP >180/110 and accompanied by hypertensive s/s, patient confirms understanding.    Last 6 Patient Entered Readings                                          Most Recent A1c: 6.3% on 5/1/2019  (Goal: 7%)     Recent Readings 5/31/2019 5/29/2019 5/28/2019 5/27/2019 5/26/2019    Blood Glucose (mg/dL) 126 103 88 139 197        Patient denies s/s or episodes of hypoglycemia (weakness, dizziness, hunger, shakiness, nausea, headache, heart palpitations, sweating, fatigue, anxiety, etc.).    Patient denies s/s of hyperglycemia (headache, increased thirst, increased urination, fatigue, blurred vision).    Current medication regimen:  Hypertension Medications             amLODIPine (NORVASC) 2.5 MG tablet Take 1 tablet (2.5 mg total) by mouth once daily.    valsartan (DIOVAN) 160 MG tablet Take 1 tablet (160 mg total) by mouth once daily.        Diabetes Medications             metFORMIN (GLUCOPHAGE-XR) 500 MG 24 hr tablet Take 2 tablets (1,000 mg total) by mouth daily with breakfast.        Assessment:  Patient's current 30-day average is above goal of <130/80 mmHg based on ACC/AHA HTN guidelines, but is trending down.     Diabetes is controlled based on patient's last A1C. SMBG readings reviewed and are above. Patient is handling metformin 1000 mg XR with no complication or reactions.     Health maintenance is not up to  date. Patient is due for a foot exam, pneumococcal vaccine, and colonoscopy. All mentioned to patient and encouraged him to schedule.     Plan:  BP still not controlled, increase amlodipine to 5 mg daily. Continue all other medications as scheduled.     Patients health , Valerie Aggarwal, will be following up every 3-4 weeks.   I will continue to monitor regularly and will follow-up in 3 weeks, sooner if blood pressure or blood glucose begins to trend upward or downward.       Patient has my contact information and knows to call with any concerns or clinical changes.

## 2019-06-03 ENCOUNTER — TELEPHONE (OUTPATIENT)
Dept: SLEEP MEDICINE | Facility: OTHER | Age: 49
End: 2019-06-03

## 2019-06-03 DIAGNOSIS — E11.69 HYPERLIPIDEMIA ASSOCIATED WITH TYPE 2 DIABETES MELLITUS: ICD-10-CM

## 2019-06-03 DIAGNOSIS — E78.5 HYPERLIPIDEMIA ASSOCIATED WITH TYPE 2 DIABETES MELLITUS: ICD-10-CM

## 2019-06-03 RX ORDER — ATORVASTATIN CALCIUM 40 MG/1
40 TABLET, FILM COATED ORAL DAILY
Qty: 30 TABLET | Refills: 1 | Status: SHIPPED | OUTPATIENT
Start: 2019-06-03 | End: 2019-08-12 | Stop reason: SDUPTHER

## 2019-06-07 DIAGNOSIS — E11.9 TYPE 2 DIABETES MELLITUS: ICD-10-CM

## 2019-06-14 ENCOUNTER — PATIENT OUTREACH (OUTPATIENT)
Dept: OTHER | Facility: OTHER | Age: 49
End: 2019-06-14

## 2019-06-14 DIAGNOSIS — E11.59 HYPERTENSION ASSOCIATED WITH DIABETES: Primary | ICD-10-CM

## 2019-06-14 DIAGNOSIS — I15.2 HYPERTENSION ASSOCIATED WITH DIABETES: Primary | ICD-10-CM

## 2019-06-14 NOTE — PROGRESS NOTES
HPI:  Called Mr. Tracy Gabriel for hypertension and diabetes digital medicine follow-up. Patient did not answer, no option to leave VM.      Last 5 Patient Entered Readings                                      Current 30 Day Average: 147/94     Recent Readings 6/14/2019 6/13/2019 6/13/2019 6/12/2019 6/12/2019    SBP (mmHg) 142 154 165 147 159    DBP (mmHg) 100 100 110 98 91    Pulse 85 89 80 83 84          Last 6 Patient Entered Readings                                          Most Recent A1c: 6.3% on 5/1/2019  (Goal: 7%)     Recent Readings 6/14/2019 6/14/2019 6/14/2019 6/13/2019 6/13/2019    Blood Glucose (mg/dL) 137 137 164 96 151        Assessment:  Patient's current 30-day average is above goal of <130/80 mmHg based on ACC/AHA HTN guidelines.     Diabetes is controlled based on patient's last A1C. SMBG readings reviewed and are near/at goal.     Health maintenance is not up to date, patient is overdue for a foot exam, colonoscopy, pneumococcal vaccine, and shingles vaccine.    Current medication regimen:  Hypertension Medications             amLODIPine (NORVASC) 5 MG tablet Take 1 tablet (5 mg total) by mouth once daily.    valsartan (DIOVAN) 160 MG tablet Take 1 tablet (160 mg total) by mouth once daily.        Diabetes Medications             metFORMIN (GLUCOPHAGE-XR) 500 MG 24 hr tablet Take 2 tablets (1,000 mg total) by mouth daily with breakfast.        Plan to be discussed with patient when he calls back or with next outreach:  Increase valsartan to 320 mg QD, confirm compliance of amlodipine 5 mg QD increased dose.  Confirm compliance to metformin 1000 mg XR BID. Verify symptom improvement.     Patients health , Valerie Aggarwal, will follow-up as scheduled.    I will continue to monitor regularly and will follow-up in 1 week, sooner if blood pressure or blood glucose begins to trend upward or downward.       Patient has my contact information and knows to call with any concerns or clinical  changes.

## 2019-06-21 RX ORDER — VALSARTAN 320 MG/1
320 TABLET ORAL DAILY
Qty: 90 TABLET | Refills: 1 | Status: SHIPPED | OUTPATIENT
Start: 2019-06-21 | End: 2019-08-12 | Stop reason: SDUPTHER

## 2019-06-21 NOTE — PROGRESS NOTES
HPI:  Called . Tracy Gabriel for hypertension and diabetes digital medicine follow-up. Patient reports compliance  to medication regimen with no complaints/complaints. Improvement in GI symptoms with metformin XR, patient endorses full compliance now.    Last 5 Patient Entered Readings                                      Current 30 Day Average: 148/96     Recent Readings 6/20/2019 6/19/2019 6/18/2019 6/17/2019 6/17/2019    SBP (mmHg) 149 145 143 124 129    DBP (mmHg) 87 90 93 77 73    Pulse 107 83 80 85 92        BP is slowly trending down with increased amlodipine dosing. Not at goal, but patient has not had any hypotensive symptoms.     Patient denies s/s of hypotension (lightheadedness, dizziness, nausea, fatigue) associated with low readings. Instructed patient to inform me if this occurs, patient confirms understanding.    Patient denies s/s of hypertension (SOB, CP, severe headaches, changes in vision) associated with high readings. Instructed patient to go to the ED if BP >180/110 and accompanied by hypertensive s/s, patient confirms understanding.    Last 6 Patient Entered Readings                                          Most Recent A1c: 6.3% on 5/1/2019  (Goal: 7%)     Recent Readings 6/20/2019 6/19/2019 6/18/2019 6/17/2019 6/16/2019    Blood Glucose (mg/dL) 230 139 147 131 157        Patient denies s/s or episodes of hypoglycemia (weakness, dizziness, hunger, shakiness, nausea, headache, heart palpitations, sweating, fatigue, anxiety, etc.).    Patient denies s/s of hyperglycemia (headache, increased thirst, increased urination, fatigue, blurred vision).    Assessment:  Patient's current 30-day average is above goal of <130/80 mmHg based on ACC/AHA HTN guidelines.     Diabetes is controlled based on patient's last A1C. SMBG readings reviewed and are near goal, improving with improved metformin compliance.    Health maintenance is not up to date.    Current medication regimen:  Hypertension  Medications             amLODIPine (NORVASC) 5 MG tablet Take 1 tablet (5 mg total) by mouth once daily.    valsartan (DIOVAN) 160 MG tablet Take 1 tablet (160 mg total) by mouth once daily.        Diabetes Medications             metFORMIN (GLUCOPHAGE-XR) 500 MG 24 hr tablet Take 2 tablets (1,000 mg total) by mouth daily with breakfast.        Plan:  Increase valsartan to 320 mg QD - Continue the rest of his current regimen     Patients health , Valerie Aggarwal, will follow-up as scheduled.    I will continue to monitor regularly and will follow-up in 6-8 weeks, sooner if blood pressure or blood glucose begins to trend upward or downward.       Patient has my contact information and knows to call with any concerns or clinical changes.

## 2019-06-27 ENCOUNTER — PATIENT OUTREACH (OUTPATIENT)
Dept: OTHER | Facility: OTHER | Age: 49
End: 2019-06-27

## 2019-06-27 NOTE — PROGRESS NOTES
Last 5 Patient Entered Readings                                      Current 30 Day Average: 148/96     Recent Readings 6/26/2019 6/25/2019 6/25/2019 6/25/2019 6/24/2019    SBP (mmHg) 161 138 133 162 152    DBP (mmHg) 104 87 89 106 89    Pulse 79 83 87 80 85          Last 6 Patient Entered Readings                                          Most Recent A1c: 6.3% on 5/1/2019  (Goal: 7%)     Recent Readings 6/26/2019 6/25/2019 6/25/2019 6/24/2019 6/24/2019    Blood Glucose (mg/dL) 133 137 140 154 161        Digital Medicine: Health  Follow Up    Patient states that he is going in for a sleep study next week and hopes that can help lower his BP.    Lifestyle Modifications:    1.Dietary Modifications (Sodium intake <2,000mg/day, food labels, dining out): Patient states no change in diet.  He is still trying to eat less and watch his salt intake.  He has been drinking more water.    2.Physical Activity: Patient says that he continues to get his steps in.    3.Medication Therapy: Patient has been compliant with the medication regimen.    4.Patient has the following medication side effects/concerns: none reported.  (Frequency/Alleviating factors/Precipitating factors, etc.)     Follow up with . Tracy Gabriel completed. No further questions or concerns. Will continue to follow up to achieve health goals.

## 2019-06-28 ENCOUNTER — TELEPHONE (OUTPATIENT)
Dept: SLEEP MEDICINE | Facility: OTHER | Age: 49
End: 2019-06-28

## 2019-07-01 ENCOUNTER — TELEPHONE (OUTPATIENT)
Dept: SLEEP MEDICINE | Facility: OTHER | Age: 49
End: 2019-07-01

## 2019-07-16 ENCOUNTER — PATIENT OUTREACH (OUTPATIENT)
Dept: OTHER | Facility: OTHER | Age: 49
End: 2019-07-16

## 2019-07-16 DIAGNOSIS — E11.59 HYPERTENSION ASSOCIATED WITH DIABETES: Primary | ICD-10-CM

## 2019-07-16 DIAGNOSIS — I15.2 HYPERTENSION ASSOCIATED WITH DIABETES: Primary | ICD-10-CM

## 2019-07-16 RX ORDER — AMLODIPINE BESYLATE 10 MG/1
10 TABLET ORAL DAILY
Qty: 90 TABLET | Refills: 1 | Status: SHIPPED | OUTPATIENT
Start: 2019-07-16 | End: 2020-01-14

## 2019-07-16 NOTE — PROGRESS NOTES
HPI:  Called . Tracy Gabriel for hypertension and diabetes digital medicine follow-up. Patient reports adherence  to medication regimen with no complaints/complaints.   Last 5 Patient Entered Readings                                      Current 30 Day Average: 145/95     Recent Readings 7/15/2019 7/15/2019 7/13/2019 7/12/2019 7/12/2019    SBP (mmHg) 152 144 158 151 152    DBP (mmHg) 100 110 106 98 110    Pulse 84 92 85 87 89        Patient denies s/s of hypotension (lightheadedness, dizziness, nausea, fatigue) associated with low readings. Instructed patient to inform me if this occurs, patient confirms understanding.    Patient denies s/s of hypertension (SOB, CP, severe headaches, changes in vision) associated with high readings. Instructed patient to go to the ED if BP >180/110 and accompanied by hypertensive s/s, patient confirms understanding.    Last 6 Patient Entered Readings                                          Most Recent A1c: 6.3% on 5/1/2019  (Goal: 7%)     Recent Readings 7/15/2019 7/12/2019 7/11/2019 7/10/2019 7/9/2019    Blood Glucose (mg/dL) 123 143 129 150 97        Patient denies s/s or episodes of hypoglycemia (weakness, dizziness, hunger, shakiness, nausea, headache, heart palpitations, sweating, fatigue, anxiety, etc.).    Patient denies s/s of hyperglycemia (headache, increased thirst, increased urination, fatigue, blurred vision).    Assessment:  Patient's current 30-day average is 145/95 goal of <130/80 mmHg based on ACC/AHA HTN guidelines. Above goal - was trending down, but has reached a plateau.     Diabetes is controlled based on patient's last A1C. SMBG readings reviewed and are largely at goal.     Health maintenance is not up to date.  Current medication regimen:  Hypertension Medications             amLODIPine (NORVASC) 5 MG tablet Take 1 tablet (5 mg total) by mouth once daily.    valsartan (DIOVAN) 320 MG tablet Take 1 tablet (320 mg total) by mouth once daily.         Diabetes Medications             metFORMIN (GLUCOPHAGE-XR) 500 MG 24 hr tablet Take 2 tablets (1,000 mg total) by mouth daily with breakfast.        Plan:  Increase amlodipine to 10 mg QD.   Continue valsartan 320 mg QD.     No changes to diabetes regimen. Patient well controlled.     Patients health , Valerie Aggarwal, will follow-up as scheduled.    I will continue to monitor regularly and will follow-up in 4-6 weeks, sooner if blood pressure or blood glucose begins to trend upward or downward.       Patient has my contact information and knows to call with any concerns or clinical changes.

## 2019-07-24 ENCOUNTER — TELEPHONE (OUTPATIENT)
Dept: SLEEP MEDICINE | Facility: OTHER | Age: 49
End: 2019-07-24

## 2019-07-25 ENCOUNTER — HOSPITAL ENCOUNTER (OUTPATIENT)
Dept: SLEEP MEDICINE | Facility: OTHER | Age: 49
Discharge: HOME | End: 2019-07-25
Attending: NURSE PRACTITIONER
Payer: COMMERCIAL

## 2019-07-25 DIAGNOSIS — G47.33 OSA (OBSTRUCTIVE SLEEP APNEA): ICD-10-CM

## 2019-07-25 DIAGNOSIS — G47.30 SLEEP APNEA, UNSPECIFIED TYPE: ICD-10-CM

## 2019-07-25 LAB
LEFT EYE DM RETINOPATHY: NEGATIVE
RIGHT EYE DM RETINOPATHY: NEGATIVE

## 2019-07-25 PROCEDURE — 95800 SLP STDY UNATTENDED: CPT

## 2019-07-28 ENCOUNTER — HOSPITAL ENCOUNTER (INPATIENT)
Facility: OTHER | Age: 49
LOS: 3 days | Discharge: HOME | DRG: 392 | End: 2019-07-31
Attending: EMERGENCY MEDICINE | Admitting: INTERNAL MEDICINE
Payer: COMMERCIAL

## 2019-07-28 ENCOUNTER — OFFICE VISIT (OUTPATIENT)
Dept: URGENT CARE | Facility: CLINIC | Age: 49
End: 2019-07-28
Payer: COMMERCIAL

## 2019-07-28 VITALS
HEART RATE: 107 BPM | RESPIRATION RATE: 18 BRPM | TEMPERATURE: 100 F | BODY MASS INDEX: 35.68 KG/M2 | HEIGHT: 66 IN | SYSTOLIC BLOOD PRESSURE: 132 MMHG | DIASTOLIC BLOOD PRESSURE: 81 MMHG | WEIGHT: 222 LBS | OXYGEN SATURATION: 99 %

## 2019-07-28 DIAGNOSIS — R11.0 NAUSEA: ICD-10-CM

## 2019-07-28 DIAGNOSIS — R50.9 FEVER, UNSPECIFIED FEVER CAUSE: ICD-10-CM

## 2019-07-28 DIAGNOSIS — K57.92 ACUTE DIVERTICULITIS: Primary | ICD-10-CM

## 2019-07-28 DIAGNOSIS — E11.9 CONTROLLED TYPE 2 DIABETES MELLITUS WITHOUT COMPLICATION, WITHOUT LONG-TERM CURRENT USE OF INSULIN: Chronic | ICD-10-CM

## 2019-07-28 DIAGNOSIS — R10.30 LOWER ABDOMINAL PAIN: ICD-10-CM

## 2019-07-28 DIAGNOSIS — R10.9 ABDOMINAL PAIN, UNSPECIFIED ABDOMINAL LOCATION: ICD-10-CM

## 2019-07-28 DIAGNOSIS — R10.31 RIGHT LOWER QUADRANT ABDOMINAL PAIN: Primary | ICD-10-CM

## 2019-07-28 LAB
ALBUMIN SERPL BCP-MCNC: 3.9 G/DL (ref 3.5–5.2)
ALP SERPL-CCNC: 59 U/L (ref 55–135)
ALT SERPL W/O P-5'-P-CCNC: 40 U/L (ref 10–44)
ANION GAP SERPL CALC-SCNC: 14 MMOL/L (ref 8–16)
AST SERPL-CCNC: 23 U/L (ref 10–40)
BASOPHILS # BLD AUTO: 0.04 K/UL (ref 0–0.2)
BASOPHILS NFR BLD: 0.4 % (ref 0–1.9)
BILIRUB SERPL-MCNC: 1.5 MG/DL (ref 0.1–1)
BILIRUB UR QL STRIP: NEGATIVE
BILIRUB UR QL STRIP: NEGATIVE
BUN SERPL-MCNC: 9 MG/DL (ref 6–20)
CALCIUM SERPL-MCNC: 9.4 MG/DL (ref 8.7–10.5)
CHLORIDE SERPL-SCNC: 99 MMOL/L (ref 95–110)
CLARITY UR: CLEAR
CO2 SERPL-SCNC: 24 MMOL/L (ref 23–29)
COLOR UR: YELLOW
CREAT SERPL-MCNC: 0.8 MG/DL (ref 0.5–1.4)
DIFFERENTIAL METHOD: ABNORMAL
EOSINOPHIL # BLD AUTO: 0.1 K/UL (ref 0–0.5)
EOSINOPHIL NFR BLD: 1 % (ref 0–8)
ERYTHROCYTE [DISTWIDTH] IN BLOOD BY AUTOMATED COUNT: 12.1 % (ref 11.5–14.5)
EST. GFR  (AFRICAN AMERICAN): >60 ML/MIN/1.73 M^2
EST. GFR  (NON AFRICAN AMERICAN): >60 ML/MIN/1.73 M^2
GLUCOSE SERPL-MCNC: 104 MG/DL (ref 70–110)
GLUCOSE UR QL STRIP: NEGATIVE
GLUCOSE UR QL STRIP: NEGATIVE
HCT VFR BLD AUTO: 39 % (ref 40–54)
HGB BLD-MCNC: 13.5 G/DL (ref 14–18)
HGB UR QL STRIP: NEGATIVE
IMM GRANULOCYTES # BLD AUTO: 0.14 K/UL (ref 0–0.04)
IMM GRANULOCYTES NFR BLD AUTO: 1.4 % (ref 0–0.5)
KETONES UR QL STRIP: NEGATIVE
KETONES UR QL STRIP: NEGATIVE
LEUKOCYTE ESTERASE UR QL STRIP: NEGATIVE
LEUKOCYTE ESTERASE UR QL STRIP: NEGATIVE
LIPASE SERPL-CCNC: 40 U/L (ref 4–60)
LYMPHOCYTES # BLD AUTO: 1.6 K/UL (ref 1–4.8)
LYMPHOCYTES NFR BLD: 15.2 % (ref 18–48)
MCH RBC QN AUTO: 32 PG (ref 27–31)
MCHC RBC AUTO-ENTMCNC: 34.6 G/DL (ref 32–36)
MCV RBC AUTO: 92 FL (ref 82–98)
MONOCYTES # BLD AUTO: 1.3 K/UL (ref 0.3–1)
MONOCYTES NFR BLD: 12.6 % (ref 4–15)
NEUTROPHILS # BLD AUTO: 7.1 K/UL (ref 1.8–7.7)
NEUTROPHILS NFR BLD: 69.4 % (ref 38–73)
NITRITE UR QL STRIP: NEGATIVE
NRBC BLD-RTO: 0 /100 WBC
PH UR STRIP: 6 [PH] (ref 5–8)
PH, POC UA: 7 (ref 5–8)
PLATELET # BLD AUTO: 248 K/UL (ref 150–350)
PMV BLD AUTO: 8.5 FL (ref 9.2–12.9)
POC BLOOD, URINE: NEGATIVE
POC NITRATES, URINE: NEGATIVE
POTASSIUM SERPL-SCNC: 4.1 MMOL/L (ref 3.5–5.1)
PROT SERPL-MCNC: 7.6 G/DL (ref 6–8.4)
PROT UR QL STRIP: NEGATIVE
PROT UR QL STRIP: NEGATIVE
RBC # BLD AUTO: 4.22 M/UL (ref 4.6–6.2)
SODIUM SERPL-SCNC: 137 MMOL/L (ref 136–145)
SP GR UR STRIP: 1.01 (ref 1–1.03)
SP GR UR STRIP: <=1.005 (ref 1–1.03)
URN SPEC COLLECT METH UR: ABNORMAL
UROBILINOGEN UR STRIP-ACNC: NEGATIVE EU/DL
UROBILINOGEN UR STRIP-ACNC: NORMAL (ref 0.3–2.2)
WBC # BLD AUTO: 10.22 K/UL (ref 3.9–12.7)

## 2019-07-28 PROCEDURE — 99215 PR OFFICE/OUTPT VISIT, EST, LEVL V, 40-54 MIN: ICD-10-PCS | Mod: S$GLB,,, | Performed by: NURSE PRACTITIONER

## 2019-07-28 PROCEDURE — 25000003 PHARM REV CODE 250: Performed by: PHYSICIAN ASSISTANT

## 2019-07-28 PROCEDURE — 11000001 HC ACUTE MED/SURG PRIVATE ROOM

## 2019-07-28 PROCEDURE — 3075F PR MOST RECENT SYSTOLIC BLOOD PRESS GE 130-139MM HG: ICD-10-PCS | Mod: CPTII,S$GLB,, | Performed by: NURSE PRACTITIONER

## 2019-07-28 PROCEDURE — 80053 COMPREHEN METABOLIC PANEL: CPT

## 2019-07-28 PROCEDURE — 99285 EMERGENCY DEPT VISIT HI MDM: CPT | Mod: 25

## 2019-07-28 PROCEDURE — 96374 THER/PROPH/DIAG INJ IV PUSH: CPT

## 2019-07-28 PROCEDURE — 83690 ASSAY OF LIPASE: CPT

## 2019-07-28 PROCEDURE — 81003 URINALYSIS AUTO W/O SCOPE: CPT | Mod: QW,S$GLB,, | Performed by: NURSE PRACTITIONER

## 2019-07-28 PROCEDURE — 87040 BLOOD CULTURE FOR BACTERIA: CPT | Mod: 59

## 2019-07-28 PROCEDURE — 99215 OFFICE O/P EST HI 40 MIN: CPT | Mod: S$GLB,,, | Performed by: NURSE PRACTITIONER

## 2019-07-28 PROCEDURE — 96361 HYDRATE IV INFUSION ADD-ON: CPT

## 2019-07-28 PROCEDURE — 25500020 PHARM REV CODE 255: Performed by: EMERGENCY MEDICINE

## 2019-07-28 PROCEDURE — 3079F DIAST BP 80-89 MM HG: CPT | Mod: CPTII,S$GLB,, | Performed by: NURSE PRACTITIONER

## 2019-07-28 PROCEDURE — 85025 COMPLETE CBC W/AUTO DIFF WBC: CPT

## 2019-07-28 PROCEDURE — 96375 TX/PRO/DX INJ NEW DRUG ADDON: CPT | Mod: 59

## 2019-07-28 PROCEDURE — 3075F SYST BP GE 130 - 139MM HG: CPT | Mod: CPTII,S$GLB,, | Performed by: NURSE PRACTITIONER

## 2019-07-28 PROCEDURE — 36415 COLL VENOUS BLD VENIPUNCTURE: CPT

## 2019-07-28 PROCEDURE — S0030 INJECTION, METRONIDAZOLE: HCPCS | Performed by: PHYSICIAN ASSISTANT

## 2019-07-28 PROCEDURE — 99223 PR INITIAL HOSPITAL CARE,LEVL III: ICD-10-PCS | Mod: ,,, | Performed by: PHYSICIAN ASSISTANT

## 2019-07-28 PROCEDURE — 3008F PR BODY MASS INDEX (BMI) DOCUMENTED: ICD-10-PCS | Mod: CPTII,S$GLB,, | Performed by: NURSE PRACTITIONER

## 2019-07-28 PROCEDURE — 81003 POCT URINALYSIS, DIPSTICK, AUTOMATED, W/O SCOPE: ICD-10-PCS | Mod: QW,S$GLB,, | Performed by: NURSE PRACTITIONER

## 2019-07-28 PROCEDURE — 3008F BODY MASS INDEX DOCD: CPT | Mod: CPTII,S$GLB,, | Performed by: NURSE PRACTITIONER

## 2019-07-28 PROCEDURE — 99223 1ST HOSP IP/OBS HIGH 75: CPT | Mod: ,,, | Performed by: PHYSICIAN ASSISTANT

## 2019-07-28 PROCEDURE — 81003 URINALYSIS AUTO W/O SCOPE: CPT

## 2019-07-28 PROCEDURE — 3079F PR MOST RECENT DIASTOLIC BLOOD PRESSURE 80-89 MM HG: ICD-10-PCS | Mod: CPTII,S$GLB,, | Performed by: NURSE PRACTITIONER

## 2019-07-28 PROCEDURE — 63600175 PHARM REV CODE 636 W HCPCS: Performed by: PHYSICIAN ASSISTANT

## 2019-07-28 RX ORDER — SODIUM CHLORIDE 9 MG/ML
INJECTION, SOLUTION INTRAVENOUS CONTINUOUS
Status: DISCONTINUED | OUTPATIENT
Start: 2019-07-29 | End: 2019-07-31

## 2019-07-28 RX ORDER — ACETAMINOPHEN 325 MG/1
650 TABLET ORAL EVERY 4 HOURS PRN
Status: DISCONTINUED | OUTPATIENT
Start: 2019-07-29 | End: 2019-07-31 | Stop reason: HOSPADM

## 2019-07-28 RX ORDER — ONDANSETRON 2 MG/ML
8 INJECTION INTRAMUSCULAR; INTRAVENOUS EVERY 8 HOURS PRN
Status: DISCONTINUED | OUTPATIENT
Start: 2019-07-29 | End: 2019-07-31 | Stop reason: HOSPADM

## 2019-07-28 RX ORDER — IBUPROFEN 400 MG/1
800 TABLET ORAL
Status: COMPLETED | OUTPATIENT
Start: 2019-07-28 | End: 2019-07-28

## 2019-07-28 RX ORDER — KETOROLAC TROMETHAMINE 30 MG/ML
15 INJECTION, SOLUTION INTRAMUSCULAR; INTRAVENOUS
Status: DISCONTINUED | OUTPATIENT
Start: 2019-07-28 | End: 2019-07-28

## 2019-07-28 RX ORDER — AMLODIPINE BESYLATE 5 MG/1
10 TABLET ORAL DAILY
Status: DISCONTINUED | OUTPATIENT
Start: 2019-07-29 | End: 2019-07-31 | Stop reason: HOSPADM

## 2019-07-28 RX ORDER — ONDANSETRON 2 MG/ML
4 INJECTION INTRAMUSCULAR; INTRAVENOUS
Status: COMPLETED | OUTPATIENT
Start: 2019-07-28 | End: 2019-07-28

## 2019-07-28 RX ORDER — CIPROFLOXACIN 2 MG/ML
400 INJECTION, SOLUTION INTRAVENOUS
Status: COMPLETED | OUTPATIENT
Start: 2019-07-28 | End: 2019-07-28

## 2019-07-28 RX ORDER — METRONIDAZOLE 500 MG/100ML
500 INJECTION, SOLUTION INTRAVENOUS
Status: COMPLETED | OUTPATIENT
Start: 2019-07-28 | End: 2019-07-28

## 2019-07-28 RX ORDER — ACETAMINOPHEN 500 MG
1000 TABLET ORAL
Status: COMPLETED | OUTPATIENT
Start: 2019-07-28 | End: 2019-07-28

## 2019-07-28 RX ORDER — MORPHINE SULFATE 2 MG/ML
2 INJECTION, SOLUTION INTRAMUSCULAR; INTRAVENOUS EVERY 4 HOURS PRN
Status: DISCONTINUED | OUTPATIENT
Start: 2019-07-29 | End: 2019-07-31 | Stop reason: HOSPADM

## 2019-07-28 RX ORDER — OXYCODONE HYDROCHLORIDE 5 MG/1
5 TABLET ORAL EVERY 6 HOURS PRN
Status: DISCONTINUED | OUTPATIENT
Start: 2019-07-29 | End: 2019-07-31 | Stop reason: HOSPADM

## 2019-07-28 RX ORDER — INSULIN ASPART 100 [IU]/ML
0-5 INJECTION, SOLUTION INTRAVENOUS; SUBCUTANEOUS EVERY 6 HOURS PRN
Status: DISCONTINUED | OUTPATIENT
Start: 2019-07-29 | End: 2019-07-31 | Stop reason: HOSPADM

## 2019-07-28 RX ORDER — HEPARIN SODIUM 5000 [USP'U]/ML
5000 INJECTION, SOLUTION INTRAVENOUS; SUBCUTANEOUS EVERY 8 HOURS
Status: DISCONTINUED | OUTPATIENT
Start: 2019-07-29 | End: 2019-07-31 | Stop reason: HOSPADM

## 2019-07-28 RX ORDER — ATORVASTATIN CALCIUM 20 MG/1
40 TABLET, FILM COATED ORAL DAILY
Status: DISCONTINUED | OUTPATIENT
Start: 2019-07-29 | End: 2019-07-31 | Stop reason: HOSPADM

## 2019-07-28 RX ORDER — SODIUM CHLORIDE 0.9 % (FLUSH) 0.9 %
10 SYRINGE (ML) INJECTION
Status: DISCONTINUED | OUTPATIENT
Start: 2019-07-28 | End: 2019-07-31 | Stop reason: HOSPADM

## 2019-07-28 RX ORDER — SODIUM CHLORIDE 0.9 % (FLUSH) 0.9 %
10 SYRINGE (ML) INJECTION
Status: DISCONTINUED | OUTPATIENT
Start: 2019-07-29 | End: 2019-07-29

## 2019-07-28 RX ORDER — KETOROLAC TROMETHAMINE 30 MG/ML
15 INJECTION, SOLUTION INTRAMUSCULAR; INTRAVENOUS
Status: COMPLETED | OUTPATIENT
Start: 2019-07-28 | End: 2019-07-28

## 2019-07-28 RX ORDER — GLUCAGON 1 MG
1 KIT INJECTION
Status: DISCONTINUED | OUTPATIENT
Start: 2019-07-29 | End: 2019-07-31 | Stop reason: HOSPADM

## 2019-07-28 RX ORDER — VALSARTAN 80 MG/1
320 TABLET ORAL DAILY
Status: DISCONTINUED | OUTPATIENT
Start: 2019-07-29 | End: 2019-07-31 | Stop reason: HOSPADM

## 2019-07-28 RX ADMIN — IBUPROFEN 800 MG: 400 TABLET, FILM COATED ORAL at 09:07

## 2019-07-28 RX ADMIN — SODIUM CHLORIDE: 0.9 INJECTION, SOLUTION INTRAVENOUS at 11:07

## 2019-07-28 RX ADMIN — IOHEXOL 100 ML: 350 INJECTION, SOLUTION INTRAVENOUS at 08:07

## 2019-07-28 RX ADMIN — SODIUM CHLORIDE 1000 ML: 0.9 INJECTION, SOLUTION INTRAVENOUS at 07:07

## 2019-07-28 RX ADMIN — ONDANSETRON 4 MG: 2 INJECTION INTRAMUSCULAR; INTRAVENOUS at 07:07

## 2019-07-28 RX ADMIN — METRONIDAZOLE 500 MG: 500 INJECTION, SOLUTION INTRAVENOUS at 10:07

## 2019-07-28 RX ADMIN — ACETAMINOPHEN 1000 MG: 500 TABLET ORAL at 07:07

## 2019-07-28 RX ADMIN — CIPROFLOXACIN 400 MG: 2 INJECTION, SOLUTION INTRAVENOUS at 09:07

## 2019-07-28 RX ADMIN — KETOROLAC TROMETHAMINE 15 MG: 30 INJECTION, SOLUTION INTRAMUSCULAR at 07:07

## 2019-07-28 NOTE — PATIENT INSTRUCTIONS
Abdominal Pain, Possible Appendicitis, Repeat Exam, Male    Based on your visit today, the exact cause of your abdominal (stomach) pain is not certain. You have some of the early symptoms of appendicitis. Because these symptoms can be similar to other stomach problems, however, the diagnosis cannot be made at this time.  Waiting for more time to pass and repeating the exam is the best way to find out whether you have appendicitis. Within the next 12 to 24 hours, the cause of your stomach pain should become clear. During this time, you need to watch for any new symptoms or worsening of your condition. (See below.)  Symptoms  The most common symptom of appendicitis is pain in the abdomen. Since the appendix is in the lower right part of the abdomen, that is the most common place to have pain. Typically, the pain starts near the navel (belly button) and then moves lower and to the right over hours. The pain also tends to worsen over time. It may hurt especially when moving, straining, or coughing.  Other symptoms include:  · Loss of appetite  · Nausea, vomiting  · Low-grade fever  · Constipation or diarrhea  · Not passing gas  Home care  · Rest until your next exam. No lifting, straining, or strenuous activities.  · You may be told not to eat or drink anything before your next exam. Be sure to follow any instructions youre given. If you are allowed to eat:  ¨ Eat a diet low in fiber (called a low-residue diet). Foods allowed include refined breads, white rice, fruit and vegetable juices without pulp, and tender meats. These foods will pass more easily through the colon.  ¨ Avoid whole-grain foods, whole fruits and vegetables, meats, seeds and nuts, fried or fatty foods, dairy, and alcohol and spicy foods.   Follow-up care  Return for your next exam as directed.  When to seek medical advice  Call your healthcare provider or seek treatment right away if:  · You have a fever of 100.4°F (38°C) or higher, or as directed  by your provider.  · Your pain gets worse or moves to the lower right part of your abdomen.  · You have nausea or vomiting that worsens.  · You have diarrhea or constipation that worsens.  · You have blood in your vomit or stool (dark red or black color).  · Your abdomen becomes swollen.  · You become weak or dizzy.  Call 911  Call 911 right away if:  · You have trouble breathing.  · You become very confused or sleepy.  · You feel faint or lose consciousness.  · You have an usually fast heart rate.  Date Last Reviewed: 6/24/2015  © 8900-7217 [employer]. [address] [place] [address], [place] [address]. All rights reserved. This information is not intended as a substitute for professional medical care. Always follow your healthcare professional's instructions.

## 2019-07-28 NOTE — ED PROVIDER NOTES
Encounter Date: 2019       History     Chief Complaint   Patient presents with    Abdominal Pain     RLQ abd pain x 3 days w/ nausea and constipation     49 year old male presents to the ED with complaints of RLQ abdominal pain and nausea for the last 3 days. Denies vomiting. Reports subjective fever but did not check temp at home. Denies urinary symptoms. He reports that he had loose stools but admits that it is now hard small pellets. Admits to taking gas x without relief. Was seen at  with normal urine dip and sent here for further evaluation and treatment. Reports pain is 5/10.    The history is provided by the patient.     Review of patient's allergies indicates:  No Known Allergies  Past Medical History:   Diagnosis Date    Hypertension      History reviewed. No pertinent surgical history.  Family History   Problem Relation Age of Onset    Hypertension Mother     Heart disease Father     No Known Problems Sister     Diabetes Maternal Grandmother      Social History     Tobacco Use    Smoking status: Former Smoker     Packs/day: 1.00     Years: 14.00     Pack years: 14.00     Types: Cigarettes     Start date: 1987     Last attempt to quit: 2001     Years since quittin.4    Smokeless tobacco: Never Used   Substance Use Topics    Alcohol use: Yes     Alcohol/week: 12.0 oz     Types: 20 Standard drinks or equivalent per week     Comment: quit 10/2017    Drug use: Not on file     Review of Systems   Constitutional: Positive for fever.   HENT: Negative for sore throat.    Respiratory: Negative for shortness of breath.    Cardiovascular: Negative for chest pain.   Gastrointestinal: Positive for abdominal pain, constipation and nausea. Negative for diarrhea and vomiting.   Genitourinary: Negative for difficulty urinating, dysuria, flank pain, frequency, hematuria and urgency.   Musculoskeletal: Negative for back pain.   Skin: Negative for rash.   Neurological: Negative for weakness.    Hematological: Does not bruise/bleed easily.       Physical Exam     Initial Vitals [07/28/19 1835]   BP Pulse Resp Temp SpO2   (!) 154/95 103 20 100.1 °F (37.8 °C) 97 %      MAP       --         Physical Exam    Constitutional: He appears well-developed and well-nourished. He is not diaphoretic.  Non-toxic appearance. No distress.   HENT:   Head: Normocephalic and atraumatic.   Right Ear: External ear normal.   Left Ear: External ear normal.   Nose: Nose normal.   Mouth/Throat: Oropharynx is clear and moist.   Eyes: Conjunctivae, EOM and lids are normal. Pupils are equal, round, and reactive to light. No scleral icterus.   Neck: Normal range of motion and phonation normal. Neck supple.   Cardiovascular: Normal rate, regular rhythm and normal heart sounds. Exam reveals no gallop and no friction rub.    No murmur heard.  Pulmonary/Chest: Effort normal and breath sounds normal. No respiratory distress. He has no decreased breath sounds. He has no wheezes. He has no rhonchi. He has no rales.   Abdominal: Soft. Normal appearance and bowel sounds are normal. He exhibits no distension. There is tenderness in the right lower quadrant, periumbilical area and suprapubic area. There is guarding (RLQ and below umbilicus) and tenderness at McBurney's point. There is no rigidity, no rebound, no CVA tenderness and negative Torres's sign.   Musculoskeletal: Normal range of motion.   No obvious deformities, moving all extremities, normal gait   Neurological: He is alert and oriented to person, place, and time. No sensory deficit.   Skin: Skin is warm, dry and intact. No lesion and no rash noted. No erythema.   Psychiatric: He has a normal mood and affect. His speech is normal and behavior is normal. Judgment normal. Cognition and memory are normal.         ED Course   Procedures  Labs Reviewed   CBC W/ AUTO DIFFERENTIAL - Abnormal; Notable for the following components:       Result Value    RBC 4.22 (*)     Hemoglobin 13.5 (*)      Hematocrit 39.0 (*)     Mean Corpuscular Hemoglobin 32.0 (*)     MPV 8.5 (*)     Immature Granulocytes 1.4 (*)     Immature Grans (Abs) 0.14 (*)     Mono # 1.3 (*)     Lymph% 15.2 (*)     All other components within normal limits   COMPREHENSIVE METABOLIC PANEL - Abnormal; Notable for the following components:    Total Bilirubin 1.5 (*)     All other components within normal limits   CULTURE, BLOOD   CULTURE, BLOOD   LIPASE   URINALYSIS, REFLEX TO URINE CULTURE          Imaging Results          CT Abdomen Pelvis With Contrast (Final result)  Result time 07/28/19 20:19:19    Final result by Tika Hazel MD (07/28/19 20:19:19)                 Impression:      Acute sigmoid diverticulitis without abscess or free air or proximal dilation.    Normal appendix.  No urolithiasis.  Fatty infiltration of liver.  Single-vessel coronary disease.      Electronically signed by: Tika Hazel  Date:    07/28/2019  Time:    20:19             Narrative:    EXAMINATION:  CT ABDOMEN PELVIS WITH CONTRAST    CLINICAL HISTORY:  RLQ pain, appendicitis suspected;    TECHNIQUE:  Low dose axial images, sagittal and coronal reformations were obtained from the lung bases to the pubic symphysis following the IV administration of 100 mL of Omnipaque 350 without oral contrast.    COMPARISON:  None.    FINDINGS:  Chest: The heart is normal size.  No pericardial effusion.  There is atherosclerotic calcification of the LAD.  The visualized esophagus appears normal.  No adenopathy found.  The visualized thoracic aorta appears normal.  The visualized lungs show no edema or consolidation or nodule or pneumothorax or pleural effusion.    Abdomen: There is fatty infiltration of the liver.  The spleen and pancreas and adrenal glands appear normal.  The gallbladder appears normal.  Common bile duct measures about 5 mm within normal limits.  The abdominal aorta and IVC appear normal.  No periaortic adenopathy found.    The kidneys show symmetric  enhancement without hydronephrosis or perinephric stranding.  Renal morphology appears normal.  No ureteral dilation or calculus.  No urolithiasis found.    Pelvis: The urinary bladder appears normal.  No inguinal hernia or pelvic adenopathy.    Bowel and mesentery: The terminal ileum appears normal axial image 95.  The appendix appears normal axial image 111 there is acute inflammatory reaction surrounding the distal sigmoid colon is seen in the right paramedian aspect of the pelvis axial image 127.  No definite micro perforation or free air identified.  There is sigmoid colonic wall thickening involve.  No definite drainable abscess collection found.  No proximal dilation seen.  There are scattered diverticula of the left colon and sigmoid colon as well.  No small bowel dilation or wall thickening or pneumatosis.  No gross free fluid or abscess collection.    Skeleton: No rib fracture.  Visualized femurs appear intact.  No osteonecrosis.  The sacrum and SI joints appear normal.  Visualized spine appears intact.  No compression deformity or endplate erosion.                                 Medical Decision Making:   History:   I obtained history from: someone other than patient.       <> Summary of History: Spouse  Old Medical Records: I decided to obtain old medical records.  Initial Assessment:   49-year-old male with complaints consistent with lower abdominal pain with fever concerning for diverticulitis.  Patient mildly tachycardic with a temp of a 100.1° on arrival.  On exam he had pain and tenderness palpation to the right lower quadrant, periumbilical region and suprapubic region.  He did have guarding on exam.  No CVA tenderness palpation and no tenderness palpation to the left lower quadrant or upper abdomen.  No emesis in the emergency department.  Seen at urgent care and sent here for further workup to rule out appendicitis.  Patient had urine obtained at urgent care with no acute findings.  Clinical  Tests:   Lab Tests: Ordered and Reviewed  Radiological Study: Ordered and Reviewed  ED Management:  Workup obtained to rule out appendicitis.  He was administered IV fluids, Toradol, Zofran as well as oral Tylenol in the emergency department.  No elevation white blood cell count H&H is stable.  Labs are otherwise benign.  CT concerning for acute sigmoid diverticulitis without evidence of abscess or perforation.  No evidence of acute appendicitis.  Patient does have sigmoid colonic wall thickening present.  No definite drainable abscess collection found.  No proximal dilatation seen.  Scattered diverticula in the left colon and sigmoid colon as well. No small bowel dilatation or wall thickening or pneumatosis.  No gross free fluid or abscess collection.  Patient's distal sigmoid colon is seen in the right paramedian aspect of the pelvis.  Patient did spike a fever of 101.3° F. He was administered oral ibuprofen.  8:40 PM case management states patient meets inpatient status. Discussed with [facility] Aydin BRANDT. Will admit for further evaluation and treatment.  Informed patient and spouse of the plan.  They state understanding and are in agreement.  This patient was discussed with the attending physician, who agrees with treatment plan. This is the extent of patient's complaints today  Other:   I have discussed this case with another health care provider.       <> Summary of the Discussion: Makenna, attending ED physician and Aydin [facility] PA  This note was created using [employer] dictation.  There may be typographical errors secondary to dictation.                        Clinical Impression:     1. Acute diverticulitis    2. Fever, unspecified fever cause    3. Lower abdominal pain            Disposition:   Disposition: Discharged  Condition: Stable                        Rhea Garcia PA-C  07/28/19 2048

## 2019-07-28 NOTE — PROGRESS NOTES
"Subjective:       Patient ID: Tracy Gabriel is a 49 y.o. male.    Vitals:  height is 5' 6" (1.676 m) and weight is 100.7 kg (222 lb). His temperature is 100.1 °F (37.8 °C). His blood pressure is 132/81 and his pulse is 107. His respiration is 18 and oxygen saturation is 99%.     Chief Complaint: Abdominal Pain    RLQ abdominal pain for 2 days. Patient still has his appendix. No appetitie.     Abdominal Pain   This is a new problem. The current episode started in the past 7 days. The onset quality is gradual. The problem occurs intermittently. The problem has been unchanged. The pain is located in the RLQ. The pain is at a severity of 5/10. The quality of the pain is aching. The abdominal pain does not radiate. Pertinent negatives include no arthralgias, diarrhea, dysuria, fever, frequency, headaches, myalgias, nausea or vomiting. The pain is aggravated by palpation. Relieved by: Gas X. Treatments tried: Gas X.       Constitution: Negative for chills, fatigue and fever.   HENT: Negative for congestion and sore throat.    Neck: Negative for painful lymph nodes.   Cardiovascular: Negative for chest pain and leg swelling.   Eyes: Negative for double vision and blurred vision.   Respiratory: Negative for cough and shortness of breath.    Gastrointestinal: Positive for abdominal pain. Negative for nausea, vomiting and diarrhea.   Genitourinary: Negative for dysuria, frequency and urgency.   Musculoskeletal: Negative for joint pain, joint swelling, muscle cramps and muscle ache.   Skin: Negative for color change, pale and rash.   Allergic/Immunologic: Negative for seasonal allergies.   Neurological: Negative for dizziness, history of vertigo, light-headedness, passing out and headaches.   Hematologic/Lymphatic: Negative for swollen lymph nodes, easy bruising/bleeding and history of blood clots. Does not bruise/bleed easily.   Psychiatric/Behavioral: Negative for nervous/anxious, sleep disturbance and depression. The " patient is not nervous/anxious.        Objective:      Physical Exam   Constitutional: He is oriented to person, place, and time. He appears well-developed and well-nourished. He is cooperative.  Non-toxic appearance. He does not appear ill. No distress.   HENT:   Head: Normocephalic and atraumatic.   Right Ear: Hearing, tympanic membrane, external ear and ear canal normal.   Left Ear: Hearing, tympanic membrane, external ear and ear canal normal.   Nose: Nose normal. No mucosal edema, rhinorrhea or nasal deformity. No epistaxis. Right sinus exhibits no maxillary sinus tenderness and no frontal sinus tenderness. Left sinus exhibits no maxillary sinus tenderness and no frontal sinus tenderness.   Mouth/Throat: Uvula is midline, oropharynx is clear and moist and mucous membranes are normal. No trismus in the jaw. Normal dentition. No uvula swelling. No posterior oropharyngeal erythema.   Eyes: Conjunctivae and lids are normal. Right eye exhibits no discharge. Left eye exhibits no discharge. No scleral icterus.   Sclera clear bilat   Neck: Trachea normal, normal range of motion, full passive range of motion without pain and phonation normal. Neck supple.   Cardiovascular: Normal rate, regular rhythm, normal heart sounds, intact distal pulses and normal pulses.   Pulmonary/Chest: Effort normal and breath sounds normal. No respiratory distress.   Abdominal: Soft. Normal appearance and bowel sounds are normal. He exhibits no distension, no pulsatile midline mass and no mass. There is tenderness in the right lower quadrant. There is tenderness at McBurney's point.   Musculoskeletal: Normal range of motion. He exhibits no edema or deformity.   Neurological: He is alert and oriented to person, place, and time. He exhibits normal muscle tone. Coordination normal.   Skin: Skin is warm, dry and intact. He is not diaphoretic. No pallor.   Psychiatric: He has a normal mood and affect. His speech is normal and behavior is normal.  Judgment and thought content normal. Cognition and memory are normal.   Nursing note and vitals reviewed.      Results for orders placed or performed in visit on 07/28/19   POCT Urinalysis, Dipstick, Automated, W/O Scope   Result Value Ref Range    POC Blood, Urine Negative Negative    POC Bilirubin, Urine Negative Negative    POC Urobilinogen, Urine Normal 0.3 - 2.2    POC Ketones, Urine Negative Negative    POC Protein, Urine Negative Negative    POC Nitrates, Urine Negative Negative    POC Glucose, Urine Negative Negative    pH, UA 7.0 5 - 8    POC Specific Gravity, Urine 1.010 1.003 - 1.029    POC Leukocytes, Urine Negative Negative       Assessment:       1. Right lower quadrant abdominal pain    2. Abdominal pain, unspecified abdominal location    3. Nausea        Plan:       Spoke with Dr. Betts who is in agreement with plan to sent patient to emergency room.     Right lower quadrant abdominal pain  -     Refer to Emergency Dept.    Abdominal pain, unspecified abdominal location  -     POCT Urinalysis, Dipstick, Automated, W/O Scope    Nausea      Patient Instructions     Abdominal Pain, Possible Appendicitis, Repeat Exam, Male    Based on your visit today, the exact cause of your abdominal (stomach) pain is not certain. You have some of the early symptoms of appendicitis. Because these symptoms can be similar to other stomach problems, however, the diagnosis cannot be made at this time.  Waiting for more time to pass and repeating the exam is the best way to find out whether you have appendicitis. Within the next 12 to 24 hours, the cause of your stomach pain should become clear. During this time, you need to watch for any new symptoms or worsening of your condition. (See below.)  Symptoms  The most common symptom of appendicitis is pain in the abdomen. Since the appendix is in the lower right part of the abdomen, that is the most common place to have pain. Typically, the pain starts near the navel (belly  button) and then moves lower and to the right over hours. The pain also tends to worsen over time. It may hurt especially when moving, straining, or coughing.  Other symptoms include:  · Loss of appetite  · Nausea, vomiting  · Low-grade fever  · Constipation or diarrhea  · Not passing gas  Home care  · Rest until your next exam. No lifting, straining, or strenuous activities.  · You may be told not to eat or drink anything before your next exam. Be sure to follow any instructions youre given. If you are allowed to eat:  ¨ Eat a diet low in fiber (called a low-residue diet). Foods allowed include refined breads, white rice, fruit and vegetable juices without pulp, and tender meats. These foods will pass more easily through the colon.  ¨ Avoid whole-grain foods, whole fruits and vegetables, meats, seeds and nuts, fried or fatty foods, dairy, and alcohol and spicy foods.   Follow-up care  Return for your next exam as directed.  When to seek medical advice  Call your healthcare provider or seek treatment right away if:  · You have a fever of 100.4°F (38°C) or higher, or as directed by your provider.  · Your pain gets worse or moves to the lower right part of your abdomen.  · You have nausea or vomiting that worsens.  · You have diarrhea or constipation that worsens.  · You have blood in your vomit or stool (dark red or black color).  · Your abdomen becomes swollen.  · You become weak or dizzy.  Call 911  Call 911 right away if:  · You have trouble breathing.  · You become very confused or sleepy.  · You feel faint or lose consciousness.  · You have an usually fast heart rate.  Date Last Reviewed: 6/24/2015  © 9191-6853 [employer]. [address] [place] [address], [place] [address]. All rights reserved. This information is not intended as a substitute for professional medical care. Always follow your healthcare professional's instructions.

## 2019-07-29 PROBLEM — E78.5 HYPERLIPIDEMIA ASSOCIATED WITH TYPE 2 DIABETES MELLITUS: Chronic | Status: ACTIVE | Noted: 2017-11-03

## 2019-07-29 PROBLEM — E11.59 HYPERTENSION ASSOCIATED WITH DIABETES: Chronic | Status: ACTIVE | Noted: 2017-11-17

## 2019-07-29 PROBLEM — E11.9 CONTROLLED TYPE 2 DIABETES MELLITUS WITHOUT COMPLICATION, WITHOUT LONG-TERM CURRENT USE OF INSULIN: Chronic | Status: ACTIVE | Noted: 2017-11-03

## 2019-07-29 PROBLEM — G47.33 OSA (OBSTRUCTIVE SLEEP APNEA): Chronic | Status: ACTIVE | Noted: 2017-11-04

## 2019-07-29 PROBLEM — G47.33 OBSTRUCTIVE SLEEP APNEA: Status: ACTIVE | Noted: 2017-11-04

## 2019-07-29 PROBLEM — E11.69 HYPERLIPIDEMIA ASSOCIATED WITH TYPE 2 DIABETES MELLITUS: Chronic | Status: ACTIVE | Noted: 2017-11-03

## 2019-07-29 PROBLEM — I15.2 HYPERTENSION ASSOCIATED WITH DIABETES: Chronic | Status: ACTIVE | Noted: 2017-11-17

## 2019-07-29 PROBLEM — R06.83 SNORING: Status: RESOLVED | Noted: 2017-11-04 | Resolved: 2019-07-29

## 2019-07-29 LAB
ANION GAP SERPL CALC-SCNC: 8 MMOL/L (ref 8–16)
BASOPHILS # BLD AUTO: 0.03 K/UL (ref 0–0.2)
BASOPHILS NFR BLD: 0.4 % (ref 0–1.9)
BUN SERPL-MCNC: 10 MG/DL (ref 6–20)
CALCIUM SERPL-MCNC: 8.6 MG/DL (ref 8.7–10.5)
CHLORIDE SERPL-SCNC: 104 MMOL/L (ref 95–110)
CO2 SERPL-SCNC: 28 MMOL/L (ref 23–29)
CREAT SERPL-MCNC: 0.8 MG/DL (ref 0.5–1.4)
DIFFERENTIAL METHOD: ABNORMAL
EOSINOPHIL # BLD AUTO: 0.2 K/UL (ref 0–0.5)
EOSINOPHIL NFR BLD: 2 % (ref 0–8)
ERYTHROCYTE [DISTWIDTH] IN BLOOD BY AUTOMATED COUNT: 12.3 % (ref 11.5–14.5)
EST. GFR  (AFRICAN AMERICAN): >60 ML/MIN/1.73 M^2
EST. GFR  (NON AFRICAN AMERICAN): >60 ML/MIN/1.73 M^2
ESTIMATED AVG GLUCOSE: 131 MG/DL (ref 68–131)
GLUCOSE SERPL-MCNC: 106 MG/DL (ref 70–110)
HBA1C MFR BLD HPLC: 6.2 % (ref 4–5.6)
HCT VFR BLD AUTO: 33.9 % (ref 40–54)
HGB BLD-MCNC: 11.7 G/DL (ref 14–18)
IMM GRANULOCYTES # BLD AUTO: 0.09 K/UL (ref 0–0.04)
IMM GRANULOCYTES NFR BLD AUTO: 1.1 % (ref 0–0.5)
LYMPHOCYTES # BLD AUTO: 1.2 K/UL (ref 1–4.8)
LYMPHOCYTES NFR BLD: 14.9 % (ref 18–48)
MCH RBC QN AUTO: 32 PG (ref 27–31)
MCHC RBC AUTO-ENTMCNC: 34.5 G/DL (ref 32–36)
MCV RBC AUTO: 93 FL (ref 82–98)
MONOCYTES # BLD AUTO: 1.3 K/UL (ref 0.3–1)
MONOCYTES NFR BLD: 16.6 % (ref 4–15)
NEUTROPHILS # BLD AUTO: 5.2 K/UL (ref 1.8–7.7)
NEUTROPHILS NFR BLD: 65 % (ref 38–73)
NRBC BLD-RTO: 0 /100 WBC
PLATELET # BLD AUTO: 211 K/UL (ref 150–350)
PMV BLD AUTO: 8.9 FL (ref 9.2–12.9)
POCT GLUCOSE: 90 MG/DL (ref 70–110)
POCT GLUCOSE: 97 MG/DL (ref 70–110)
POCT GLUCOSE: 98 MG/DL (ref 70–110)
POTASSIUM SERPL-SCNC: 3.8 MMOL/L (ref 3.5–5.1)
RBC # BLD AUTO: 3.66 M/UL (ref 4.6–6.2)
SODIUM SERPL-SCNC: 140 MMOL/L (ref 136–145)
WBC # BLD AUTO: 7.94 K/UL (ref 3.9–12.7)

## 2019-07-29 PROCEDURE — 99233 SBSQ HOSP IP/OBS HIGH 50: CPT | Mod: ,,, | Performed by: INTERNAL MEDICINE

## 2019-07-29 PROCEDURE — 95800 SLP STDY UNATTENDED: CPT | Mod: 26,,, | Performed by: PSYCHIATRY & NEUROLOGY

## 2019-07-29 PROCEDURE — 80048 BASIC METABOLIC PNL TOTAL CA: CPT

## 2019-07-29 PROCEDURE — 94660 CPAP INITIATION&MGMT: CPT

## 2019-07-29 PROCEDURE — 95800 PR SLEEP STUDY, UNATTENDED, RECORD HEART RATE/O2 SAT/RESP ANAL/SLEEP TIME: ICD-10-PCS | Mod: 26,,, | Performed by: PSYCHIATRY & NEUROLOGY

## 2019-07-29 PROCEDURE — 27000190 HC CPAP FULL FACE MASK W/VALVE

## 2019-07-29 PROCEDURE — 63600175 PHARM REV CODE 636 W HCPCS: Performed by: PHYSICIAN ASSISTANT

## 2019-07-29 PROCEDURE — S0030 INJECTION, METRONIDAZOLE: HCPCS | Performed by: PHYSICIAN ASSISTANT

## 2019-07-29 PROCEDURE — 99233 PR SUBSEQUENT HOSPITAL CARE,LEVL III: ICD-10-PCS | Mod: ,,, | Performed by: INTERNAL MEDICINE

## 2019-07-29 PROCEDURE — 83036 HEMOGLOBIN GLYCOSYLATED A1C: CPT

## 2019-07-29 PROCEDURE — 94761 N-INVAS EAR/PLS OXIMETRY MLT: CPT

## 2019-07-29 PROCEDURE — 36415 COLL VENOUS BLD VENIPUNCTURE: CPT

## 2019-07-29 PROCEDURE — 85025 COMPLETE CBC W/AUTO DIFF WBC: CPT

## 2019-07-29 PROCEDURE — 11000001 HC ACUTE MED/SURG PRIVATE ROOM

## 2019-07-29 PROCEDURE — 99900035 HC TECH TIME PER 15 MIN (STAT)

## 2019-07-29 PROCEDURE — 25000003 PHARM REV CODE 250: Performed by: PHYSICIAN ASSISTANT

## 2019-07-29 RX ORDER — CIPROFLOXACIN 2 MG/ML
400 INJECTION, SOLUTION INTRAVENOUS
Status: DISCONTINUED | OUTPATIENT
Start: 2019-07-29 | End: 2019-07-31

## 2019-07-29 RX ORDER — METRONIDAZOLE 500 MG/100ML
500 INJECTION, SOLUTION INTRAVENOUS
Status: DISCONTINUED | OUTPATIENT
Start: 2019-07-29 | End: 2019-07-31

## 2019-07-29 RX ADMIN — METRONIDAZOLE 500 MG: 500 INJECTION, SOLUTION INTRAVENOUS at 02:07

## 2019-07-29 RX ADMIN — HEPARIN SODIUM 5000 UNITS: 5000 INJECTION, SOLUTION INTRAVENOUS; SUBCUTANEOUS at 02:07

## 2019-07-29 RX ADMIN — VALSARTAN 320 MG: 80 TABLET, FILM COATED ORAL at 09:07

## 2019-07-29 RX ADMIN — METRONIDAZOLE 500 MG: 500 INJECTION, SOLUTION INTRAVENOUS at 06:07

## 2019-07-29 RX ADMIN — SODIUM CHLORIDE: 0.9 INJECTION, SOLUTION INTRAVENOUS at 09:07

## 2019-07-29 RX ADMIN — HEPARIN SODIUM 5000 UNITS: 5000 INJECTION, SOLUTION INTRAVENOUS; SUBCUTANEOUS at 06:07

## 2019-07-29 RX ADMIN — CIPROFLOXACIN 400 MG: 2 INJECTION, SOLUTION INTRAVENOUS at 09:07

## 2019-07-29 RX ADMIN — CIPROFLOXACIN 400 MG: 2 INJECTION, SOLUTION INTRAVENOUS at 08:07

## 2019-07-29 RX ADMIN — ATORVASTATIN CALCIUM 40 MG: 20 TABLET, FILM COATED ORAL at 09:07

## 2019-07-29 RX ADMIN — AMLODIPINE BESYLATE 10 MG: 5 TABLET ORAL at 09:07

## 2019-07-29 RX ADMIN — METRONIDAZOLE 500 MG: 500 INJECTION, SOLUTION INTRAVENOUS at 10:07

## 2019-07-29 RX ADMIN — HEPARIN SODIUM 5000 UNITS: 5000 INJECTION, SOLUTION INTRAVENOUS; SUBCUTANEOUS at 10:07

## 2019-07-29 NOTE — ASSESSMENT & PLAN NOTE
- normotensive upon admission  - continue home meds: amlodipine 10 mg QD, valsartan 320 mg QD  - monitor

## 2019-07-29 NOTE — PLAN OF CARE
Problem: Adult Inpatient Plan of Care  Goal: Plan of Care Review  Outcome: Ongoing (interventions implemented as appropriate)  Pt sleeping comfortably on CPAP, on room air while awake. Will continue to monitor.

## 2019-07-29 NOTE — PLAN OF CARE
Problem: Fall Injury Risk  Goal: Absence of Fall and Fall-Related Injury    Intervention: Identify and Manage Contributors to Fall Injury Risk  No acute events overnite, pt remain free of injury, no c/o pain

## 2019-07-29 NOTE — HPI
Mr. Tracy Gabriel is a 49 y.o. male, PMH of HTN, JOSE, NIDDM-2, HLD, who presented to [facility] ED on 7/28/19 2/2 RLQ abdominal pain x 3 days. This is associated with nausea, and subjective fever. He denies vomiting, UTI symptoms. ED evaluation shows sigmoid diverticulitis on CT scan, without perforation or abscess, and elevated Tbili on labs. He was treated with Cipro and flagyl and admitted to inpatient status.

## 2019-07-29 NOTE — ASSESSMENT & PLAN NOTE
- Mr. Tracy Gabriel is admitted to inpatient status   - CT shows no abscess of perforation associated with his sigmoid diverticulitis   - continue Cipro and flagyl  - monitor

## 2019-07-29 NOTE — HOSPITAL COURSE
After admission, Mr. Gabriel was placed on IV ciprofloxacin and metronidazole with improvement in symptoms. He was started on clears but had increasing abdominal pain and nausea so was continued on IV antibiotics. His diet was advanced, which he tolerated well. He is now tolerating regular diet without nausea or vomiting. He is stable for discharge home today on PO ciprofloxacin and flagyl to complete 10 day course. He was recommended to get screening colonoscopy at least 6 after resolution of infection.

## 2019-07-29 NOTE — PLAN OF CARE
SW met with pt at bedside to complete discharge assessment, verified PCP and uses [facility] on [facility].  Pt's wife, present and will provide transportation home.  No needs identified at this time.     07/29/19 0856   Discharge Assessment   Assessment Type Discharge Planning Assessment   Confirmed/corrected address and phone number on facesheet? No   Assessment information obtained from? Patient;Caregiver   Communicated expected length of stay with patient/caregiver no   Prior to hospitilization cognitive status: Alert/Oriented   Prior to hospitalization functional status: Independent   Current cognitive status: Alert/Oriented   Current Functional Status: Independent   Lives With spouse   Able to Return to Prior Arrangements yes   Is patient able to care for self after discharge? Yes   Readmission Within the Last 30 Days no previous admission in last 30 days   Patient currently being followed by outpatient case management? No   Patient currently receives any other outside agency services? No   Equipment Currently Used at Home none   Do you have any problems affording any of your prescribed medications? No   Is the patient taking medications as prescribed? yes   Does the patient have transportation home? Yes   Transportation Anticipated family or friend will provide   Does the patient receive services at the [facility]? No   Discharge Plan A Home   DME Needed Upon Discharge  none   Patient/Family in Agreement with Plan yes

## 2019-07-29 NOTE — ASSESSMENT & PLAN NOTE
- not hyperglycemic upon admission  - last A1C:   Lab Results   Component Value Date    HGBA1C 6.3 (H) 05/01/2019    - A1C pending for AM   - hold oral antidiabetic meds   - NPO for tonight    - SSI with [id number] [id number]

## 2019-07-29 NOTE — PLAN OF CARE
Problem: Adult Inpatient Plan of Care  Goal: Optimal Comfort and Wellbeing  Outcome: Ongoing (interventions implemented as appropriate)  Plan of care reviewed with patient, medications explained. Pt currently resting comfortably in bed and appears to be resting in between care. VSS, bed in lowest, locked position with call light in reach. Purposeful rounding done.  No new needs identified at this time, will continue to monitor.

## 2019-07-29 NOTE — SUBJECTIVE & OBJECTIVE
Interval History: No acute events since admission. Feeling improved this morning. No other concerns at this time.    Review of Systems   Constitutional: Positive for fever. Negative for chills.   Respiratory: Negative for cough and shortness of breath.    Cardiovascular: Negative for chest pain and palpitations.   Gastrointestinal: Positive for abdominal pain and nausea. Negative for vomiting.     Objective:     Vital Signs (Most Recent):  Temp: 99 °F (37.2 °C) (07/29/19 0758)  Pulse: 79 (07/29/19 0758)  Resp: 18 (07/29/19 0758)  BP: 138/71 (07/29/19 0758)  SpO2: 99 % (07/29/19 0758) Vital Signs (24h Range):  Temp:  [96.7 °F (35.9 °C)-101.3 °F (38.5 °C)] 99 °F (37.2 °C)  Pulse:  [] 79  Resp:  [18-20] 18  SpO2:  [96 %-99 %] 99 %  BP: (115-154)/(68-95) 138/71     Weight: 101 kg (222 lb 10.6 oz)  Body mass index is 35.94 kg/m².    Intake/Output Summary (Last 24 hours) at 7/29/2019 1119  Last data filed at 7/29/2019 0930  Gross per 24 hour   Intake 0 ml   Output 1025 ml   Net -1025 ml      Physical Exam   Constitutional: He is oriented to person, place, and time. He appears well-developed. No distress.   HENT:   Head: Normocephalic and atraumatic.   Eyes: Conjunctivae are normal. Right eye exhibits no discharge. Left eye exhibits no discharge.   Cardiovascular: Normal rate and intact distal pulses.   Pulmonary/Chest: Effort normal. No respiratory distress.   Abdominal: Soft. There is tenderness (LLQ > RLQ).   Musculoskeletal: Normal range of motion. He exhibits no edema.   Neurological: He is alert and oriented to person, place, and time.   Skin: Skin is warm and dry.   Nursing note and vitals reviewed.    Significant Labs:   CBC:  Recent Labs   Lab 07/28/19  1909 07/29/19  0453   WBC 10.22 7.94   HGB 13.5* 11.7*   HCT 39.0* 33.9*    211   GRAN 69.4  7.1 65.0  5.2   LYMPH 15.2*  1.6 14.9*  1.2   MONO 12.6  1.3* 16.6*  1.3*   EOS 0.1 0.2   BASO 0.04 0.03      BMP:  Recent Labs   Lab 07/28/19  8840  07/29/19  0453    140   K 4.1 3.8   CL 99 104   CO2 24 28   BUN 9 10   CREATININE 0.8 0.8    106   CALCIUM 9.4 8.6*     Significant Imaging:   No new imaging this morning.

## 2019-07-29 NOTE — H&P
[facility] Hospital Medicine  History & Physical    Patient Name: Tracy Gabriel  MRN: [id number]  Admission Date: 7/28/2019  Attending Physician: NEAL Barnes MD   Primary Care Provider: Junaid Koenig Jr, MD         Patient information was obtained from patient, past medical records and ER records.     Subjective:     Principal Problem:<principal problem not specified>    Chief Complaint:   Chief Complaint   Patient presents with    Abdominal Pain     RLQ abd pain x 3 days w/ nausea and constipation        HPI: Mr. Tracy Gabriel is a 49 y.o. male, PMH of HTN, JOSE, NIDDM-2, HLD, who presented to [facility] ED on 7/28/19 2/2 RLQ abdominal pain x 3 days. This is associated with nausea, and subjective fever. He denies vomiting, UTI symptoms. ED evaluation shows sigmoid diverticulitis on CT scan, without perforation or abscess, and elevated Tbili on labs. He was treated with Cipro and flagyl and admitted to inpatient status.     Past Medical History:   Diagnosis Date    Hypertension        History reviewed. No pertinent surgical history.    Review of patient's allergies indicates:  No Known Allergies    No current facility-administered medications on file prior to encounter.      Current Outpatient Medications on File Prior to Encounter   Medication Sig    amLODIPine (NORVASC) 10 MG tablet Take 1 tablet (10 mg total) by mouth once daily.    atorvastatin (LIPITOR) 40 MG tablet Take 1 tablet (40 mg total) by mouth once daily.    metFORMIN (GLUCOPHAGE-XR) 500 MG 24 hr tablet Take 2 tablets (1,000 mg total) by mouth daily with breakfast.    valsartan (DIOVAN) 320 MG tablet Take 1 tablet (320 mg total) by mouth once daily.    ONETOUCH DELICA LANCETS 30 gauge Misc 1 lancet by Misc.(Non-Drug; Combo Route) route once daily.    ONETOUCH VERIO Strp 1 strip by Misc.(Non-Drug; Combo Route) route once daily.     Family History     Problem Relation (Age of Onset)    Diabetes Maternal  Grandmother    Heart disease Father    Hypertension Mother    No Known Problems Sister        Tobacco Use    Smoking status: Former Smoker     Packs/day: 1.00     Years: 14.00     Pack years: 14.00     Types: Cigarettes     Start date: 1987     Last attempt to quit: 2001     Years since quittin.4    Smokeless tobacco: Never Used   Substance and Sexual Activity    Alcohol use: Yes     Alcohol/week: 12.0 oz     Types: 20 Standard drinks or equivalent per week     Comment: quit 10/2017    Drug use: Not on file    Sexual activity: Not on file     Review of Systems   Constitutional: Positive for fever. Negative for chills and diaphoresis.   Respiratory: Negative for cough, shortness of breath and wheezing.    Cardiovascular: Negative for chest pain and palpitations.   Gastrointestinal: Positive for abdominal pain. Negative for constipation, diarrhea, nausea and vomiting.   Genitourinary: Negative for decreased urine volume, difficulty urinating, dysuria, frequency, hematuria and urgency.   Musculoskeletal: Negative for back pain, joint swelling and myalgias.   Skin: Negative for color change, rash and wound.   Neurological: Negative for dizziness, syncope, weakness, light-headedness and headaches.   Psychiatric/Behavioral: Negative for confusion and decreased concentration.     Objective:     Vital Signs (Most Recent):  Temp: 96.7 °F (35.9 °C) (19)  Pulse: 65 (19)  Resp: 19 (19)  BP: 130/87 (19)  SpO2: 98 % (19) Vital Signs (24h Range):  Temp:  [96.7 °F (35.9 °C)-101.3 °F (38.5 °C)] 96.7 °F (35.9 °C)  Pulse:  [] 65  Resp:  [18-20] 19  SpO2:  [96 %-99 %] 98 %  BP: (115-154)/(68-95) 130/87     Weight: 101 kg (222 lb 10.6 oz)  Body mass index is 35.94 kg/m².    Physical Exam   Constitutional: He is oriented to person, place, and time. Vital signs are normal. He appears well-developed and well-nourished.  Non-toxic appearance. He does not have a  sickly appearance. He does not appear ill. No distress.   HENT:   Head: Normocephalic and atraumatic.   Right Ear: External ear normal.   Left Ear: External ear normal.   Eyes: Pupils are equal, round, and reactive to light. Conjunctivae and EOM are normal. No scleral icterus.   Neck: Normal range of motion. Neck supple. No JVD present. No tracheal deviation present.   Cardiovascular: Normal rate, regular rhythm, normal heart sounds and intact distal pulses. Exam reveals no gallop and no friction rub.   No murmur heard.  Pulmonary/Chest: Effort normal and breath sounds normal. No stridor. No respiratory distress. He has no wheezes. He has no rales.   Abdominal: Soft. Bowel sounds are normal. He exhibits no distension and no mass. There is no tenderness. There is no guarding.   Neurological: He is alert and oriented to person, place, and time.   Skin: Skin is warm and dry. He is not diaphoretic. No pallor.   Psychiatric: He has a normal mood and affect. His behavior is normal. Judgment and thought content normal.   Nursing note and vitals reviewed.        CRANIAL NERVES     CN III, IV, VI   Pupils are equal, round, and reactive to light.  Extraocular motions are normal.        Significant Labs:   BMP:   Recent Labs   Lab 07/28/19 1909         K 4.1   CL 99   CO2 24   BUN 9   CREATININE 0.8   CALCIUM 9.4     CBC:   Recent Labs   Lab 07/28/19 1909   WBC 10.22   HGB 13.5*   HCT 39.0*        CMP:   Recent Labs   Lab 07/28/19 1909      K 4.1   CL 99   CO2 24      BUN 9   CREATININE 0.8   CALCIUM 9.4   PROT 7.6   ALBUMIN 3.9   BILITOT 1.5*   ALKPHOS 59   AST 23   ALT 40   ANIONGAP 14   EGFRNONAA >60     Lactic Acid: No results for input(s): LACTATE in the last 48 hours.  Urine Culture: No results for input(s): LABURIN in the last 48 hours.  Urine Studies:   Recent Labs   Lab 07/28/19 2142   COLORU Yellow   APPEARANCEUA Clear   PHUR 6.0   SPECGRAV <=1.005*   PROTEINUA Negative   GLUCUA  Negative   KETONESU Negative   BILIRUBINUA Negative   OCCULTUA Negative   NITRITE Negative   UROBILINOGEN Negative   LEUKOCYTESUR Negative     All pertinent labs within the past 24 hours have been reviewed.    Significant Imaging: I have reviewed all pertinent imaging results/findings within the past 24 hours.   Imaging Results          CT Abdomen Pelvis With Contrast (Final result)  Result time 07/28/19 20:19:19    Final result by Tika Hazel MD (07/28/19 20:19:19)                 Impression:      Acute sigmoid diverticulitis without abscess or free air or proximal dilation.    Normal appendix.  No urolithiasis.  Fatty infiltration of liver.  Single-vessel coronary disease.      Electronically signed by: Tika Hazel  Date:    07/28/2019  Time:    20:19             Narrative:    EXAMINATION:  CT ABDOMEN PELVIS WITH CONTRAST    CLINICAL HISTORY:  RLQ pain, appendicitis suspected;    TECHNIQUE:  Low dose axial images, sagittal and coronal reformations were obtained from the lung bases to the pubic symphysis following the IV administration of 100 mL of Omnipaque 350 without oral contrast.    COMPARISON:  None.    FINDINGS:  Chest: The heart is normal size.  No pericardial effusion.  There is atherosclerotic calcification of the LAD.  The visualized esophagus appears normal.  No adenopathy found.  The visualized thoracic aorta appears normal.  The visualized lungs show no edema or consolidation or nodule or pneumothorax or pleural effusion.    Abdomen: There is fatty infiltration of the liver.  The spleen and pancreas and adrenal glands appear normal.  The gallbladder appears normal.  Common bile duct measures about 5 mm within normal limits.  The abdominal aorta and IVC appear normal.  No periaortic adenopathy found.    The kidneys show symmetric enhancement without hydronephrosis or perinephric stranding.  Renal morphology appears normal.  No ureteral dilation or calculus.  No urolithiasis found.    Pelvis:  The urinary bladder appears normal.  No inguinal hernia or pelvic adenopathy.    Bowel and mesentery: The terminal ileum appears normal axial image 95.  The appendix appears normal axial image 111 there is acute inflammatory reaction surrounding the distal sigmoid colon is seen in the right paramedian aspect of the pelvis axial image 127.  No definite micro perforation or free air identified.  There is sigmoid colonic wall thickening involve.  No definite drainable abscess collection found.  No proximal dilation seen.  There are scattered diverticula of the left colon and sigmoid colon as well.  No small bowel dilation or wall thickening or pneumatosis.  No gross free fluid or abscess collection.    Skeleton: No rib fracture.  Visualized femurs appear intact.  No osteonecrosis.  The sacrum and SI joints appear normal.  Visualized spine appears intact.  No compression deformity or endplate erosion.                                  Assessment/Plan:     Acute diverticulitis  - Mr. Tracy Gabriel is admitted to inpatient status   - CT shows no abscess of perforation associated with his sigmoid diverticulitis   - continue Cipro and flagyl  - monitor       Hypertension associated with diabetes  - normotensive upon admission  - continue home meds: amlodipine 10 mg QD, valsartan 320 mg QD  - monitor     Witnessed apneic spells  - states he competed home sleep study last week   - will order CPAP to titrate to his comfort     Hyperlipidemia associated with type 2 diabetes mellitus  - continue statin      Controlled type 2 diabetes mellitus without complication, without long-term current use of insulin  - not hyperglycemic upon admission  - last A1C:   Lab Results   Component Value Date    HGBA1C 6.3 (H) 05/01/2019    - A1C pending for AM   - hold oral antidiabetic meds   - NPO for tonight    - SSI with accuchekcs [id number]    VTE Risk Mitigation (From admission, onward)        Ordered     heparin (porcine) injection 5,000  Units  Every 8 hours      07/28/19 2337     IP VTE HIGH RISK PATIENT  Once      07/28/19 2337     Place sequential compression device  Until discontinued      07/28/19 2208     Place ARPIT hose  Until discontinued      07/28/19 2208             Claudia Perrin PA-C  Department of Hospital Medicine   [facility]

## 2019-07-29 NOTE — ASSESSMENT & PLAN NOTE
- Progressive abdominal pain, nausea with CT demonstrating sigmoid diverticulitis without evidence of perforation.  - Continuing ciprofloxacin 400mg IV q12h, metronidazole 500mg IV q8hr.  - Continuing IVFs with NS at 125mL/hr; advance diet as tolerated.

## 2019-07-29 NOTE — ED TRIAGE NOTES
"Pt reports to ED via personal transport with c/o of RLQ abdominal pain and nausea x1week.  Pt reports being sent to ED from  for CT scan.  Pt reports subjective fever pt states "I didn't check my temp."  Pt reports diarrhea and loose stools reports taking gas-x with no relief.  Pt reports 5/10 pain.  Pt denies vomiting.  Pt lying in stretcher, respirations even and unlabored, eyes open spontaneously.  NAD noted, AAOx4, answering questions appropriately.  Pt placed on BP cycling, pulse ox, and cardiac monitoring q30min.  Bed locked and in lowest position, SRx2 up, call light within reach.    Patient identifiers for Jose Gabriel checked and correct   LOC: Patient is awake, alert, and aware of environment with an appropriate affect.  Patient is oriented x4 and speaking appropriately.  APPEARANCE: Patient resting comfortably and in no acute distress.  Patient is clean and well groomed, patient's clothing is properly fastened.  SKIN: The skin is warm and dry.  Patient has normal skin turgor and moist mucus membranes.  Skin is intact: no bruising or breakdown noted.  MUSCULOSKELETAL: Patient is moving all extremities well, no obvious swelling or deformities noted. Pulses intact.  RESPIRATORY: Airway is open and patent.  Respirations are spontaneous, even and unlabored.  Normal effort and rate noted.  CARDIAC: Patient has a normal rate and rhythm.  No peripheral edema noted.  Capillary refill < 3 seconds.  ABDOMEN: No abd distention noted.  Bowel sounds active in all 4 quadrants.  Soft and non-tender upon palpation. SEE HPI  NEUROLOGICAL: PERRLA.  Facial expression is symmetrical.  Hand grasps are equal bilaterally.  Normal sensation in all extremities when touched with finger.  Following commands appropriately.  PAIN: Pt reports 5  ALLERGIES reported: Review of patient's allergies indicates:  No Known Allergies  "

## 2019-07-29 NOTE — PROGRESS NOTES
[facility] Hospital Medicine  Progress Note    Patient Name: Tracy Gabriel  MRN: [id number]  Patient Class: IP- Inpatient   Admission Date: 7/28/2019  Length of Stay: 1 days  Attending Physician: NEAL Barnes MD  Primary Care Provider: Junaid Koenig Jr, MD        Subjective:     Principal Problem:Acute diverticulitis      HPI:  Mr. Tracy Gabriel is a 49 y.o. male, PMH of HTN, JOSE, NIDDM-2, HLD, who presented to [facility] ED on 7/28/19 2/2 RLQ abdominal pain x 3 days. This is associated with nausea, and subjective fever. He denies vomiting, UTI symptoms. ED evaluation shows sigmoid diverticulitis on CT scan, without perforation or abscess, and elevated Tbili on labs. He was treated with Cipro and flagyl and admitted to inpatient status.     Overview/Hospital Course:  After admission, Mr. Gabriel was placed on IV ciprofloxacin and metronidazole.    Interval History: No acute events since admission. Feeling improved this morning. No other concerns at this time.    Review of Systems   Constitutional: Positive for fever. Negative for chills.   Respiratory: Negative for cough and shortness of breath.    Cardiovascular: Negative for chest pain and palpitations.   Gastrointestinal: Positive for abdominal pain and nausea. Negative for vomiting.     Objective:     Vital Signs (Most Recent):  Temp: 99 °F (37.2 °C) (07/29/19 0758)  Pulse: 79 (07/29/19 0758)  Resp: 18 (07/29/19 0758)  BP: 138/71 (07/29/19 0758)  SpO2: 99 % (07/29/19 0758) Vital Signs (24h Range):  Temp:  [96.7 °F (35.9 °C)-101.3 °F (38.5 °C)] 99 °F (37.2 °C)  Pulse:  [] 79  Resp:  [18-20] 18  SpO2:  [96 %-99 %] 99 %  BP: (115-154)/(68-95) 138/71     Weight: 101 kg (222 lb 10.6 oz)  Body mass index is 35.94 kg/m².    Intake/Output Summary (Last 24 hours) at 7/29/2019 1119  Last data filed at 7/29/2019 0930  Gross per 24 hour   Intake 0 ml   Output 1025 ml   Net -1025 ml      Physical Exam   Constitutional: He is  oriented to person, place, and time. He appears well-developed. No distress.   HENT:   Head: Normocephalic and atraumatic.   Eyes: Conjunctivae are normal. Right eye exhibits no discharge. Left eye exhibits no discharge.   Cardiovascular: Normal rate and intact distal pulses.   Pulmonary/Chest: Effort normal. No respiratory distress.   Abdominal: Soft. There is tenderness (LLQ > RLQ).   Musculoskeletal: Normal range of motion. He exhibits no edema.   Neurological: He is alert and oriented to person, place, and time.   Skin: Skin is warm and dry.   Nursing note and vitals reviewed.    Significant Labs:   CBC:  Recent Labs   Lab 07/28/19 1909 07/29/19  0453   WBC 10.22 7.94   HGB 13.5* 11.7*   HCT 39.0* 33.9*    211   GRAN 69.4  7.1 65.0  5.2   LYMPH 15.2*  1.6 14.9*  1.2   MONO 12.6  1.3* 16.6*  1.3*   EOS 0.1 0.2   BASO 0.04 0.03      BMP:  Recent Labs   Lab 07/28/19 1909 07/29/19  0453    140   K 4.1 3.8   CL 99 104   CO2 24 28   BUN 9 10   CREATININE 0.8 0.8    106   CALCIUM 9.4 8.6*     Significant Imaging:   No new imaging this morning.       Assessment/Plan:      * Acute diverticulitis  - Progressive abdominal pain, nausea with CT demonstrating sigmoid diverticulitis without evidence of perforation.  - Continuing ciprofloxacin 400mg IV q12h, metronidazole 500mg IV q8hr.  - Continuing IVFs with NS at 125mL/hr; advance diet as tolerated.    Hypertension associated with diabetes  - Continuing amlodipine 10mg PO daily, valsartan 320mg PO daily.    Obstructive sleep apnea  - Continuing CPAP qHS.    Hyperlipidemia associated with type 2 diabetes mellitus  - Continuing atorvastatin 40mg PO daily.    Controlled type 2 diabetes mellitus without complication, without long-term current use of insulin  - Continuing low-dose sliding scale insulin aspart 0-5U subQ TIDWM PRN.    VTE Risk Mitigation (From admission, onward)        Ordered     heparin (porcine) injection 5,000 Units  Every 8 hours       07/28/19 2337     IP VTE HIGH RISK PATIENT  Once      07/28/19 2337     Place sequential compression device  Until discontinued      07/28/19 2203                D Jose Barnes MD  Department of Hospital Medicine   [facility]

## 2019-07-29 NOTE — SUBJECTIVE & OBJECTIVE
Past Medical History:   Diagnosis Date    Hypertension        History reviewed. No pertinent surgical history.    Review of patient's allergies indicates:  No Known Allergies    No current facility-administered medications on file prior to encounter.      Current Outpatient Medications on File Prior to Encounter   Medication Sig    amLODIPine (NORVASC) 10 MG tablet Take 1 tablet (10 mg total) by mouth once daily.    atorvastatin (LIPITOR) 40 MG tablet Take 1 tablet (40 mg total) by mouth once daily.    metFORMIN (GLUCOPHAGE-XR) 500 MG 24 hr tablet Take 2 tablets (1,000 mg total) by mouth daily with breakfast.    valsartan (DIOVAN) 320 MG tablet Take 1 tablet (320 mg total) by mouth once daily.    ONETOUCH DELICA LANCETS 30 gauge Misc 1 lancet by Misc.(Non-Drug; Combo Route) route once daily.    ONETOUCH VERIO Strp 1 strip by Misc.(Non-Drug; Combo Route) route once daily.     Family History     Problem Relation (Age of Onset)    Diabetes Maternal Grandmother    Heart disease Father    Hypertension Mother    No Known Problems Sister        Tobacco Use    Smoking status: Former Smoker     Packs/day: 1.00     Years: 14.00     Pack years: 14.00     Types: Cigarettes     Start date: 1987     Last attempt to quit: 2001     Years since quittin.4    Smokeless tobacco: Never Used   Substance and Sexual Activity    Alcohol use: Yes     Alcohol/week: 12.0 oz     Types: 20 Standard drinks or equivalent per week     Comment: quit 10/2017    Drug use: Not on file    Sexual activity: Not on file     Review of Systems   Constitutional: Positive for fever. Negative for chills and diaphoresis.   Respiratory: Negative for cough, shortness of breath and wheezing.    Cardiovascular: Negative for chest pain and palpitations.   Gastrointestinal: Positive for abdominal pain. Negative for constipation, diarrhea, nausea and vomiting.   Genitourinary: Negative for decreased urine volume, difficulty urinating,  dysuria, frequency, hematuria and urgency.   Musculoskeletal: Negative for back pain, joint swelling and myalgias.   Skin: Negative for color change, rash and wound.   Neurological: Negative for dizziness, syncope, weakness, light-headedness and headaches.   Psychiatric/Behavioral: Negative for confusion and decreased concentration.     Objective:     Vital Signs (Most Recent):  Temp: 96.7 °F (35.9 °C) (07/29/19 0426)  Pulse: 65 (07/29/19 0426)  Resp: 19 (07/29/19 0426)  BP: 130/87 (07/29/19 0426)  SpO2: 98 % (07/29/19 0426) Vital Signs (24h Range):  Temp:  [96.7 °F (35.9 °C)-101.3 °F (38.5 °C)] 96.7 °F (35.9 °C)  Pulse:  [] 65  Resp:  [18-20] 19  SpO2:  [96 %-99 %] 98 %  BP: (115-154)/(68-95) 130/87     Weight: 101 kg (222 lb 10.6 oz)  Body mass index is 35.94 kg/m².    Physical Exam   Constitutional: He is oriented to person, place, and time. Vital signs are normal. He appears well-developed and well-nourished.  Non-toxic appearance. He does not have a sickly appearance. He does not appear ill. No distress.   HENT:   Head: Normocephalic and atraumatic.   Right Ear: External ear normal.   Left Ear: External ear normal.   Eyes: Pupils are equal, round, and reactive to light. Conjunctivae and EOM are normal. No scleral icterus.   Neck: Normal range of motion. Neck supple. No JVD present. No tracheal deviation present.   Cardiovascular: Normal rate, regular rhythm, normal heart sounds and intact distal pulses. Exam reveals no gallop and no friction rub.   No murmur heard.  Pulmonary/Chest: Effort normal and breath sounds normal. No stridor. No respiratory distress. He has no wheezes. He has no rales.   Abdominal: Soft. Bowel sounds are normal. He exhibits no distension and no mass. There is no tenderness. There is no guarding.   Neurological: He is alert and oriented to person, place, and time.   Skin: Skin is warm and dry. He is not diaphoretic. No pallor.   Psychiatric: He has a normal mood and affect. His  behavior is normal. Judgment and thought content normal.   Nursing note and vitals reviewed.        CRANIAL NERVES     CN III, IV, VI   Pupils are equal, round, and reactive to light.  Extraocular motions are normal.        Significant Labs:   BMP:   Recent Labs   Lab 07/28/19 1909         K 4.1   CL 99   CO2 24   BUN 9   CREATININE 0.8   CALCIUM 9.4     CBC:   Recent Labs   Lab 07/28/19 1909   WBC 10.22   HGB 13.5*   HCT 39.0*        CMP:   Recent Labs   Lab 07/28/19 1909      K 4.1   CL 99   CO2 24      BUN 9   CREATININE 0.8   CALCIUM 9.4   PROT 7.6   ALBUMIN 3.9   BILITOT 1.5*   ALKPHOS 59   AST 23   ALT 40   ANIONGAP 14   EGFRNONAA >60     Lactic Acid: No results for input(s): LACTATE in the last 48 hours.  Urine Culture: No results for input(s): LABURIN in the last 48 hours.  Urine Studies:   Recent Labs   Lab 07/28/19 2142   COLORU Yellow   APPEARANCEUA Clear   PHUR 6.0   SPECGRAV <=1.005*   PROTEINUA Negative   GLUCUA Negative   KETONESU Negative   BILIRUBINUA Negative   OCCULTUA Negative   NITRITE Negative   UROBILINOGEN Negative   LEUKOCYTESUR Negative     All pertinent labs within the past 24 hours have been reviewed.    Significant Imaging: I have reviewed all pertinent imaging results/findings within the past 24 hours.   Imaging Results          CT Abdomen Pelvis With Contrast (Final result)  Result time 07/28/19 20:19:19    Final result by Tika Hazel MD (07/28/19 20:19:19)                 Impression:      Acute sigmoid diverticulitis without abscess or free air or proximal dilation.    Normal appendix.  No urolithiasis.  Fatty infiltration of liver.  Single-vessel coronary disease.      Electronically signed by: Tika Hazel  Date:    07/28/2019  Time:    20:19             Narrative:    EXAMINATION:  CT ABDOMEN PELVIS WITH CONTRAST    CLINICAL HISTORY:  RLQ pain, appendicitis suspected;    TECHNIQUE:  Low dose axial images, sagittal and coronal reformations  were obtained from the lung bases to the pubic symphysis following the IV administration of 100 mL of Omnipaque 350 without oral contrast.    COMPARISON:  None.    FINDINGS:  Chest: The heart is normal size.  No pericardial effusion.  There is atherosclerotic calcification of the LAD.  The visualized esophagus appears normal.  No adenopathy found.  The visualized thoracic aorta appears normal.  The visualized lungs show no edema or consolidation or nodule or pneumothorax or pleural effusion.    Abdomen: There is fatty infiltration of the liver.  The spleen and pancreas and adrenal glands appear normal.  The gallbladder appears normal.  Common bile duct measures about 5 mm within normal limits.  The abdominal aorta and IVC appear normal.  No periaortic adenopathy found.    The kidneys show symmetric enhancement without hydronephrosis or perinephric stranding.  Renal morphology appears normal.  No ureteral dilation or calculus.  No urolithiasis found.    Pelvis: The urinary bladder appears normal.  No inguinal hernia or pelvic adenopathy.    Bowel and mesentery: The terminal ileum appears normal axial image 95.  The appendix appears normal axial image 111 there is acute inflammatory reaction surrounding the distal sigmoid colon is seen in the right paramedian aspect of the pelvis axial image 127.  No definite micro perforation or free air identified.  There is sigmoid colonic wall thickening involve.  No definite drainable abscess collection found.  No proximal dilation seen.  There are scattered diverticula of the left colon and sigmoid colon as well.  No small bowel dilation or wall thickening or pneumatosis.  No gross free fluid or abscess collection.    Skeleton: No rib fracture.  Visualized femurs appear intact.  No osteonecrosis.  The sacrum and SI joints appear normal.  Visualized spine appears intact.  No compression deformity or endplate erosion.

## 2019-07-30 LAB
ANION GAP SERPL CALC-SCNC: 8 MMOL/L (ref 8–16)
BASOPHILS # BLD AUTO: 0.04 K/UL (ref 0–0.2)
BASOPHILS NFR BLD: 0.5 % (ref 0–1.9)
BUN SERPL-MCNC: 8 MG/DL (ref 6–20)
CALCIUM SERPL-MCNC: 8.9 MG/DL (ref 8.7–10.5)
CHLORIDE SERPL-SCNC: 105 MMOL/L (ref 95–110)
CO2 SERPL-SCNC: 26 MMOL/L (ref 23–29)
CREAT SERPL-MCNC: 0.8 MG/DL (ref 0.5–1.4)
DIFFERENTIAL METHOD: ABNORMAL
EOSINOPHIL # BLD AUTO: 0.2 K/UL (ref 0–0.5)
EOSINOPHIL NFR BLD: 2 % (ref 0–8)
ERYTHROCYTE [DISTWIDTH] IN BLOOD BY AUTOMATED COUNT: 12 % (ref 11.5–14.5)
EST. GFR  (AFRICAN AMERICAN): >60 ML/MIN/1.73 M^2
EST. GFR  (NON AFRICAN AMERICAN): >60 ML/MIN/1.73 M^2
GLUCOSE SERPL-MCNC: 114 MG/DL (ref 70–110)
HCT VFR BLD AUTO: 35.4 % (ref 40–54)
HGB BLD-MCNC: 12.1 G/DL (ref 14–18)
IMM GRANULOCYTES # BLD AUTO: 0.12 K/UL (ref 0–0.04)
IMM GRANULOCYTES NFR BLD AUTO: 1.6 % (ref 0–0.5)
LYMPHOCYTES # BLD AUTO: 1.5 K/UL (ref 1–4.8)
LYMPHOCYTES NFR BLD: 20.2 % (ref 18–48)
MCH RBC QN AUTO: 31.7 PG (ref 27–31)
MCHC RBC AUTO-ENTMCNC: 34.2 G/DL (ref 32–36)
MCV RBC AUTO: 93 FL (ref 82–98)
MONOCYTES # BLD AUTO: 0.9 K/UL (ref 0.3–1)
MONOCYTES NFR BLD: 11.9 % (ref 4–15)
NEUTROPHILS # BLD AUTO: 4.7 K/UL (ref 1.8–7.7)
NEUTROPHILS NFR BLD: 63.8 % (ref 38–73)
NRBC BLD-RTO: 0 /100 WBC
PLATELET # BLD AUTO: 284 K/UL (ref 150–350)
PMV BLD AUTO: 8.9 FL (ref 9.2–12.9)
POCT GLUCOSE: 106 MG/DL (ref 70–110)
POCT GLUCOSE: 121 MG/DL (ref 70–110)
POCT GLUCOSE: 129 MG/DL (ref 70–110)
POCT GLUCOSE: 82 MG/DL (ref 70–110)
POCT GLUCOSE: 93 MG/DL (ref 70–110)
POTASSIUM SERPL-SCNC: 3.6 MMOL/L (ref 3.5–5.1)
RBC # BLD AUTO: 3.82 M/UL (ref 4.6–6.2)
SODIUM SERPL-SCNC: 139 MMOL/L (ref 136–145)
WBC # BLD AUTO: 7.39 K/UL (ref 3.9–12.7)

## 2019-07-30 PROCEDURE — S0030 INJECTION, METRONIDAZOLE: HCPCS | Performed by: PHYSICIAN ASSISTANT

## 2019-07-30 PROCEDURE — 85025 COMPLETE CBC W/AUTO DIFF WBC: CPT

## 2019-07-30 PROCEDURE — 94761 N-INVAS EAR/PLS OXIMETRY MLT: CPT

## 2019-07-30 PROCEDURE — 36415 COLL VENOUS BLD VENIPUNCTURE: CPT

## 2019-07-30 PROCEDURE — 63600175 PHARM REV CODE 636 W HCPCS: Performed by: PHYSICIAN ASSISTANT

## 2019-07-30 PROCEDURE — 94660 CPAP INITIATION&MGMT: CPT

## 2019-07-30 PROCEDURE — 99233 SBSQ HOSP IP/OBS HIGH 50: CPT | Mod: ,,, | Performed by: INTERNAL MEDICINE

## 2019-07-30 PROCEDURE — 11000001 HC ACUTE MED/SURG PRIVATE ROOM

## 2019-07-30 PROCEDURE — 99900035 HC TECH TIME PER 15 MIN (STAT)

## 2019-07-30 PROCEDURE — 99233 PR SUBSEQUENT HOSPITAL CARE,LEVL III: ICD-10-PCS | Mod: ,,, | Performed by: INTERNAL MEDICINE

## 2019-07-30 PROCEDURE — 80048 BASIC METABOLIC PNL TOTAL CA: CPT

## 2019-07-30 PROCEDURE — 25000003 PHARM REV CODE 250: Performed by: PHYSICIAN ASSISTANT

## 2019-07-30 RX ADMIN — HEPARIN SODIUM 5000 UNITS: 5000 INJECTION, SOLUTION INTRAVENOUS; SUBCUTANEOUS at 01:07

## 2019-07-30 RX ADMIN — CIPROFLOXACIN 400 MG: 2 INJECTION, SOLUTION INTRAVENOUS at 09:07

## 2019-07-30 RX ADMIN — ATORVASTATIN CALCIUM 40 MG: 20 TABLET, FILM COATED ORAL at 08:07

## 2019-07-30 RX ADMIN — AMLODIPINE BESYLATE 10 MG: 5 TABLET ORAL at 08:07

## 2019-07-30 RX ADMIN — METRONIDAZOLE 500 MG: 500 INJECTION, SOLUTION INTRAVENOUS at 01:07

## 2019-07-30 RX ADMIN — METRONIDAZOLE 500 MG: 500 INJECTION, SOLUTION INTRAVENOUS at 05:07

## 2019-07-30 RX ADMIN — SODIUM CHLORIDE: 0.9 INJECTION, SOLUTION INTRAVENOUS at 05:07

## 2019-07-30 RX ADMIN — HEPARIN SODIUM 5000 UNITS: 5000 INJECTION, SOLUTION INTRAVENOUS; SUBCUTANEOUS at 09:07

## 2019-07-30 RX ADMIN — VALSARTAN 320 MG: 80 TABLET, FILM COATED ORAL at 08:07

## 2019-07-30 RX ADMIN — METRONIDAZOLE 500 MG: 500 INJECTION, SOLUTION INTRAVENOUS at 09:07

## 2019-07-30 RX ADMIN — CIPROFLOXACIN 400 MG: 2 INJECTION, SOLUTION INTRAVENOUS at 08:07

## 2019-07-30 RX ADMIN — HEPARIN SODIUM 5000 UNITS: 5000 INJECTION, SOLUTION INTRAVENOUS; SUBCUTANEOUS at 05:07

## 2019-07-30 NOTE — PLAN OF CARE
Problem: Adult Inpatient Plan of Care  Goal: Plan of Care Review  Outcome: Ongoing (interventions implemented as appropriate)  Plan of care reviewed with patient.  VSS.  Patient ambulates/repositons independently.  IV fluids maintained.  Poor appetite.  Purposeful rounding completed, call bell within reach, no needs at this time, will continue to monitor.

## 2019-07-30 NOTE — ASSESSMENT & PLAN NOTE
- Progressive abdominal pain, nausea with CT demonstrating sigmoid diverticulitis without evidence of perforation.  - Continuing ciprofloxacin 400mg IV q12h, metronidazole 500mg IV q8hr.  - Tolerating clears but with increased abdominal pain. Continue clears as tolerated. Will advance once symptoms improve  - Decrease IVF to 75 cc/hr

## 2019-07-30 NOTE — PLAN OF CARE
Problem: Adult Inpatient Plan of Care  Goal: Plan of Care Review  Outcome: Ongoing (interventions implemented as appropriate)  Pt in no distress on room air, wearing CPAP to sleep. Will continue to monitor.

## 2019-07-30 NOTE — PLAN OF CARE
Problem: Adult Inpatient Plan of Care  Goal: Plan of Care Review  Outcome: Ongoing (interventions implemented as appropriate)  Pt remains on RA with devilbus at bedside. Pt remains stable.

## 2019-07-30 NOTE — SUBJECTIVE & OBJECTIVE
Interval History:  Pt seen and examined at bedside. He reports some improvement in symptoms but reports increased abdominal cramping, nausea and diarrhea last night after dinner. Denies emesis, fever, chills.      Review of Systems   Constitutional: Negative for chills and fever.   Respiratory: Negative for cough and shortness of breath.    Cardiovascular: Negative for chest pain and palpitations.   Gastrointestinal: Positive for abdominal pain and nausea. Negative for vomiting.     Objective:     Vital Signs (Most Recent):  Temp: 98.2 °F (36.8 °C) (07/30/19 0747)  Pulse: 73 (07/30/19 0747)  Resp: 18 (07/30/19 0747)  BP: 125/70 (07/30/19 0747)  SpO2: 98 % (07/30/19 0747) Vital Signs (24h Range):  Temp:  [98.1 °F (36.7 °C)-98.9 °F (37.2 °C)] 98.2 °F (36.8 °C)  Pulse:  [73-88] 73  Resp:  [17-22] 18  SpO2:  [95 %-99 %] 98 %  BP: (116-137)/(66-79) 125/70     Weight: 101 kg (222 lb 10.6 oz)  Body mass index is 35.94 kg/m².    Intake/Output Summary (Last 24 hours) at 7/30/2019 1123  Last data filed at 7/30/2019 0600  Gross per 24 hour   Intake 1444 ml   Output 2750 ml   Net -1306 ml      Physical Exam   Constitutional: He is oriented to person, place, and time. He appears well-developed. No distress.   HENT:   Head: Normocephalic and atraumatic.   Eyes: Conjunctivae are normal. Right eye exhibits no discharge. Left eye exhibits no discharge.   Cardiovascular: Normal rate and intact distal pulses.   Pulmonary/Chest: Effort normal. No respiratory distress.   Abdominal: Soft. There is tenderness (LLQ > RLQ).   Musculoskeletal: Normal range of motion. He exhibits no edema.   Neurological: He is alert and oriented to person, place, and time.   Skin: Skin is warm and dry.   Nursing note and vitals reviewed.    Significant Labs:   CBC:  Recent Labs   Lab 07/28/19  1909 07/29/19  0453 07/30/19  0430   WBC 10.22 7.94 7.39   HGB 13.5* 11.7* 12.1*   HCT 39.0* 33.9* 35.4*    211 284   GRAN 69.4  7.1 65.0  5.2 63.8  4.7    LYMPH 15.2*  1.6 14.9*  1.2 20.2  1.5   MONO 12.6  1.3* 16.6*  1.3* 11.9  0.9   EOS 0.1 0.2 0.2   BASO 0.04 0.03 0.04      BMP:  Recent Labs   Lab 07/28/19  1909 07/29/19  0453 07/30/19  0430    140 139   K 4.1 3.8 3.6   CL 99 104 105   CO2 24 28 26   BUN 9 10 8   CREATININE 0.8 0.8 0.8    106 114*   CALCIUM 9.4 8.6* 8.9     Significant Imaging:   No new imaging this morning.

## 2019-07-30 NOTE — PROGRESS NOTES
[facility] Hospital Medicine  Progress Note    Patient Name: Tracy Gabriel  MRN: [id number]  Patient Class: IP- Inpatient   Admission Date: 7/28/2019  Length of Stay: 2 days  Attending Physician: Shelbi Morales MD  Primary Care Provider: Junaid Koenig Jr, MD        Subjective:     Principal Problem:Acute diverticulitis      HPI:  Mr. Tracy Gabriel is a 49 y.o. male, PMH of HTN, JOSE, NIDDM-2, HLD, who presented to [facility] ED on 7/28/19 2/2 RLQ abdominal pain x 3 days. This is associated with nausea, and subjective fever. He denies vomiting, UTI symptoms. ED evaluation shows sigmoid diverticulitis on CT scan, without perforation or abscess, and elevated Tbili on labs. He was treated with Cipro and flagyl and admitted to inpatient status.     Overview/Hospital Course:  After admission, Mr. Gabriel was placed on IV ciprofloxacin and metronidazole with improvement in symptoms. He was started on clears but had increasing abdominal pain and nausea so was continued on IV antibiotics.    Interval History:  Pt seen and examined at bedside. He reports some improvement in symptoms but reports increased abdominal cramping, nausea and diarrhea last night after dinner. Denies emesis, fever, chills.      Review of Systems   Constitutional: Negative for chills and fever.   Respiratory: Negative for cough and shortness of breath.    Cardiovascular: Negative for chest pain and palpitations.   Gastrointestinal: Positive for abdominal pain and nausea. Negative for vomiting.     Objective:     Vital Signs (Most Recent):  Temp: 98.2 °F (36.8 °C) (07/30/19 0747)  Pulse: 73 (07/30/19 0747)  Resp: 18 (07/30/19 0747)  BP: 125/70 (07/30/19 0747)  SpO2: 98 % (07/30/19 0747) Vital Signs (24h Range):  Temp:  [98.1 °F (36.7 °C)-98.9 °F (37.2 °C)] 98.2 °F (36.8 °C)  Pulse:  [73-88] 73  Resp:  [17-22] 18  SpO2:  [95 %-99 %] 98 %  BP: (116-137)/(66-79) 125/70     Weight: 101 kg (222 lb 10.6 oz)  Body mass index  is 35.94 kg/m².    Intake/Output Summary (Last 24 hours) at 7/30/2019 1123  Last data filed at 7/30/2019 0600  Gross per 24 hour   Intake 1444 ml   Output 2750 ml   Net -1306 ml      Physical Exam   Constitutional: He is oriented to person, place, and time. He appears well-developed. No distress.   HENT:   Head: Normocephalic and atraumatic.   Eyes: Conjunctivae are normal. Right eye exhibits no discharge. Left eye exhibits no discharge.   Cardiovascular: Normal rate and intact distal pulses.   Pulmonary/Chest: Effort normal. No respiratory distress.   Abdominal: Soft. There is tenderness (LLQ > RLQ).   Musculoskeletal: Normal range of motion. He exhibits no edema.   Neurological: He is alert and oriented to person, place, and time.   Skin: Skin is warm and dry.   Nursing note and vitals reviewed.    Significant Labs:   CBC:  Recent Labs   Lab 07/28/19 1909 07/29/19 0453 07/30/19  0430   WBC 10.22 7.94 7.39   HGB 13.5* 11.7* 12.1*   HCT 39.0* 33.9* 35.4*    211 284   GRAN 69.4  7.1 65.0  5.2 63.8  4.7   LYMPH 15.2*  1.6 14.9*  1.2 20.2  1.5   MONO 12.6  1.3* 16.6*  1.3* 11.9  0.9   EOS 0.1 0.2 0.2   BASO 0.04 0.03 0.04      BMP:  Recent Labs   Lab 07/28/19 1909 07/29/19 0453 07/30/19  0430    140 139   K 4.1 3.8 3.6   CL 99 104 105   CO2 24 28 26   BUN 9 10 8   CREATININE 0.8 0.8 0.8    106 114*   CALCIUM 9.4 8.6* 8.9     Significant Imaging:   No new imaging this morning.       Assessment/Plan:      * Acute diverticulitis  - Progressive abdominal pain, nausea with CT demonstrating sigmoid diverticulitis without evidence of perforation.  - Continuing ciprofloxacin 400mg IV q12h, metronidazole 500mg IV q8hr.  - Tolerating clears but with increased abdominal pain. Continue clears as tolerated. Will advance once symptoms improve  - Decrease IVF to 75 cc/hr    Hypertension associated with diabetes  Controlled, continue amlodipine 10mg PO daily, valsartan 320mg PO daily.    JOSE  (obstructive sleep apnea)  Continue CPAP qHS.    Hyperlipidemia associated with type 2 diabetes mellitus  - Continuing atorvastatin 40mg PO daily.    Controlled type 2 diabetes mellitus without complication, without long-term current use of insulin  - Continuing low-dose sliding scale insulin aspart 0-5U subQ TIDWM PRN.      VTE Risk Mitigation (From admission, onward)        Ordered     heparin (porcine) injection 5,000 Units  Every 8 hours      07/28/19 2337     IP VTE HIGH RISK PATIENT  Once      07/28/19 2337     Place sequential compression device  Until discontinued      07/28/19 0635              Shelbi Morales MD  Department of Hospital Medicine   [facility]

## 2019-07-31 VITALS
OXYGEN SATURATION: 96 % | BODY MASS INDEX: 35.79 KG/M2 | HEIGHT: 66 IN | HEART RATE: 79 BPM | RESPIRATION RATE: 18 BRPM | TEMPERATURE: 98 F | WEIGHT: 222.69 LBS | SYSTOLIC BLOOD PRESSURE: 139 MMHG | DIASTOLIC BLOOD PRESSURE: 78 MMHG

## 2019-07-31 LAB
ANION GAP SERPL CALC-SCNC: 9 MMOL/L (ref 8–16)
BASOPHILS # BLD AUTO: 0.04 K/UL (ref 0–0.2)
BASOPHILS NFR BLD: 0.7 % (ref 0–1.9)
BUN SERPL-MCNC: 8 MG/DL (ref 6–20)
CALCIUM SERPL-MCNC: 9 MG/DL (ref 8.7–10.5)
CHLORIDE SERPL-SCNC: 104 MMOL/L (ref 95–110)
CO2 SERPL-SCNC: 25 MMOL/L (ref 23–29)
CREAT SERPL-MCNC: 0.8 MG/DL (ref 0.5–1.4)
DIFFERENTIAL METHOD: ABNORMAL
EOSINOPHIL # BLD AUTO: 0.2 K/UL (ref 0–0.5)
EOSINOPHIL NFR BLD: 3.8 % (ref 0–8)
ERYTHROCYTE [DISTWIDTH] IN BLOOD BY AUTOMATED COUNT: 11.9 % (ref 11.5–14.5)
EST. GFR  (AFRICAN AMERICAN): >60 ML/MIN/1.73 M^2
EST. GFR  (NON AFRICAN AMERICAN): >60 ML/MIN/1.73 M^2
GLUCOSE SERPL-MCNC: 108 MG/DL (ref 70–110)
HCT VFR BLD AUTO: 36.3 % (ref 40–54)
HGB BLD-MCNC: 12 G/DL (ref 14–18)
IMM GRANULOCYTES # BLD AUTO: 0.09 K/UL (ref 0–0.04)
IMM GRANULOCYTES NFR BLD AUTO: 1.6 % (ref 0–0.5)
LYMPHOCYTES # BLD AUTO: 1.2 K/UL (ref 1–4.8)
LYMPHOCYTES NFR BLD: 22.5 % (ref 18–48)
MCH RBC QN AUTO: 31.2 PG (ref 27–31)
MCHC RBC AUTO-ENTMCNC: 33.1 G/DL (ref 32–36)
MCV RBC AUTO: 94 FL (ref 82–98)
MONOCYTES # BLD AUTO: 0.7 K/UL (ref 0.3–1)
MONOCYTES NFR BLD: 11.8 % (ref 4–15)
NEUTROPHILS # BLD AUTO: 3.3 K/UL (ref 1.8–7.7)
NEUTROPHILS NFR BLD: 59.6 % (ref 38–73)
NRBC BLD-RTO: 0 /100 WBC
PLATELET # BLD AUTO: 279 K/UL (ref 150–350)
PMV BLD AUTO: 8.7 FL (ref 9.2–12.9)
POCT GLUCOSE: 101 MG/DL (ref 70–110)
POCT GLUCOSE: 107 MG/DL (ref 70–110)
POTASSIUM SERPL-SCNC: 3.8 MMOL/L (ref 3.5–5.1)
RBC # BLD AUTO: 3.85 M/UL (ref 4.6–6.2)
SODIUM SERPL-SCNC: 138 MMOL/L (ref 136–145)
WBC # BLD AUTO: 5.51 K/UL (ref 3.9–12.7)

## 2019-07-31 PROCEDURE — 63600175 PHARM REV CODE 636 W HCPCS: Performed by: INTERNAL MEDICINE

## 2019-07-31 PROCEDURE — 85025 COMPLETE CBC W/AUTO DIFF WBC: CPT

## 2019-07-31 PROCEDURE — 80048 BASIC METABOLIC PNL TOTAL CA: CPT

## 2019-07-31 PROCEDURE — 25000003 PHARM REV CODE 250: Performed by: PHYSICIAN ASSISTANT

## 2019-07-31 PROCEDURE — 99238 PR HOSPITAL DISCHARGE DAY,<30 MIN: ICD-10-PCS | Mod: ,,, | Performed by: INTERNAL MEDICINE

## 2019-07-31 PROCEDURE — S0030 INJECTION, METRONIDAZOLE: HCPCS | Performed by: PHYSICIAN ASSISTANT

## 2019-07-31 PROCEDURE — 94761 N-INVAS EAR/PLS OXIMETRY MLT: CPT

## 2019-07-31 PROCEDURE — 36415 COLL VENOUS BLD VENIPUNCTURE: CPT

## 2019-07-31 PROCEDURE — 99238 HOSP IP/OBS DSCHRG MGMT 30/<: CPT | Mod: ,,, | Performed by: INTERNAL MEDICINE

## 2019-07-31 PROCEDURE — 25000003 PHARM REV CODE 250: Performed by: INTERNAL MEDICINE

## 2019-07-31 PROCEDURE — 63600175 PHARM REV CODE 636 W HCPCS: Performed by: PHYSICIAN ASSISTANT

## 2019-07-31 RX ORDER — TRAMADOL HYDROCHLORIDE 50 MG/1
50 TABLET ORAL EVERY 6 HOURS PRN
Qty: 20 TABLET | Refills: 0 | Status: SHIPPED | OUTPATIENT
Start: 2019-07-31

## 2019-07-31 RX ORDER — CIPROFLOXACIN 500 MG/1
500 TABLET ORAL EVERY 12 HOURS
Status: DISCONTINUED | OUTPATIENT
Start: 2019-07-31 | End: 2019-07-31 | Stop reason: HOSPADM

## 2019-07-31 RX ORDER — CIPROFLOXACIN 500 MG/1
500 TABLET ORAL EVERY 12 HOURS
Qty: 14 TABLET | Refills: 0 | Status: SHIPPED | OUTPATIENT
Start: 2019-07-31 | End: 2019-08-07

## 2019-07-31 RX ORDER — METRONIDAZOLE 500 MG/1
500 TABLET ORAL EVERY 8 HOURS
Status: DISCONTINUED | OUTPATIENT
Start: 2019-07-31 | End: 2019-07-31 | Stop reason: HOSPADM

## 2019-07-31 RX ORDER — METRONIDAZOLE 500 MG/1
500 TABLET ORAL EVERY 8 HOURS
Qty: 21 TABLET | Refills: 0 | Status: SHIPPED | OUTPATIENT
Start: 2019-07-31 | End: 2019-08-07

## 2019-07-31 RX ADMIN — METRONIDAZOLE 500 MG: 500 INJECTION, SOLUTION INTRAVENOUS at 06:07

## 2019-07-31 RX ADMIN — VALSARTAN 320 MG: 80 TABLET, FILM COATED ORAL at 09:07

## 2019-07-31 RX ADMIN — HEPARIN SODIUM 5000 UNITS: 5000 INJECTION, SOLUTION INTRAVENOUS; SUBCUTANEOUS at 06:07

## 2019-07-31 RX ADMIN — AMLODIPINE BESYLATE 10 MG: 5 TABLET ORAL at 09:07

## 2019-07-31 RX ADMIN — CIPROFLOXACIN HYDROCHLORIDE 500 MG: 500 TABLET, FILM COATED ORAL at 09:07

## 2019-07-31 RX ADMIN — SODIUM CHLORIDE: 0.9 INJECTION, SOLUTION INTRAVENOUS at 03:07

## 2019-07-31 RX ADMIN — ATORVASTATIN CALCIUM 40 MG: 20 TABLET, FILM COATED ORAL at 09:07

## 2019-07-31 NOTE — DISCHARGE SUMMARY
[facility] Hospital Medicine  Discharge Summary      Patient Name: Tracy Gabriel  MRN: [id number]  Admission Date: 7/28/2019  Hospital Length of Stay: 3 days  Discharge Date and Time:  07/31/2019 9:54 AM  Attending Physician: Shelbi Morales MD   Discharging Provider: Shelbi Morales MD  Primary Care Provider: Junaid Koenig Jr, MD      HPI:   Mr. Tracy Gabriel is a 49 y.o. male, PMH of HTN, JOSE, NIDDM-2, HLD, who presented to [facility] ED on 7/28/19 2/2 RLQ abdominal pain x 3 days. This is associated with nausea, and subjective fever. He denies vomiting, UTI symptoms. ED evaluation shows sigmoid diverticulitis on CT scan, without perforation or abscess, and elevated Tbili on labs. He was treated with Cipro and flagyl and admitted to inpatient status.     * No surgery found *      Hospital Course:   After admission, Mr. Gabriel was placed on IV ciprofloxacin and metronidazole with improvement in symptoms. He was started on clears but had increasing abdominal pain and nausea so was continued on IV antibiotics. His diet was advanced, which he tolerated well. He is now tolerating regular diet without nausea or vomiting. He is stable for discharge home today on PO ciprofloxacin and flagyl to complete 10 day course. He was recommended to get screening colonoscopy at least 6 after resolution of infection.     Consults:     No new Assessment & Plan notes have been filed under this hospital service since the last note was generated.  Service: Hospital Medicine    Final Active Diagnoses:    Diagnosis Date Noted POA    PRINCIPAL PROBLEM:  Acute diverticulitis [K57.92] 07/28/2019 Yes    Hypertension associated with diabetes [E11.59, I10] 11/17/2017 Yes     Chronic    JOSE (obstructive sleep apnea) [G47.33] 11/04/2017 Yes     Chronic    Controlled type 2 diabetes mellitus without complication, without long-term current use of insulin [E11.9] 11/03/2017 Yes     Chronic    Hyperlipidemia  associated with type 2 diabetes mellitus [E11.69, E78.5] 11/03/2017 Yes     Chronic      Problems Resolved During this Admission:       Discharged Condition: good    Disposition: Home or Self Care    Follow Up:  Follow-up Information     Junaid Koenig Jr, MD.    Specialty:  Family Medicine  Why:  Please ask you doctor about referral for screening colonoscopy  Contact information:  [address]  [address]  [facility] [address]  [phone]                 Patient Instructions:      Diet diabetic     Activity as tolerated       Significant Diagnostic Studies: Labs:   BMP:   Recent Labs   Lab 07/30/19 0430 07/31/19 0430   * 108    138   K 3.6 3.8    104   CO2 26 25   BUN 8 8   CREATININE 0.8 0.8   CALCIUM 8.9 9.0   , CBC   Recent Labs   Lab 07/30/19 0430 07/31/19 0430   WBC 7.39 5.51   HGB 12.1* 12.0*   HCT 35.4* 36.3*    279    and All labs within the past 24 hours have been reviewed    Pending Diagnostic Studies:     None         Medications:  Reconciled Home Medications:      Medication List      START taking these medications    ciprofloxacin HCl 500 MG tablet  Commonly known as:  CIPRO  Take 1 tablet (500 mg total) by mouth every 12 (twelve) hours. for 14 doses     metroNIDAZOLE 500 MG tablet  Commonly known as:  FLAGYL  Take 1 tablet (500 mg total) by mouth every 8 (eight) hours. for 21 doses     traMADol 50 mg tablet  Commonly known as:  ULTRAM  Take 1 tablet (50 mg total) by mouth every 6 (six) hours as needed for Pain.        CONTINUE taking these medications    amLODIPine 10 MG tablet  Commonly known as:  NORVASC  Take 1 tablet (10 mg total) by mouth once daily.     atorvastatin 40 MG tablet  Commonly known as:  LIPITOR  Take 1 tablet (40 mg total) by mouth once daily.     metFORMIN 500 MG 24 hr tablet  Commonly known as:  GLUCOPHAGE-XR  Take 2 tablets (1,000 mg total) by mouth daily with breakfast.     ДМИТРИЙUCH DELICA LANCETS 30 gauge Misc  Generic drug:  lancets  1  lancet by Misc.(Non-Drug; Combo Route) route once daily.     ONETOUCH VERIO Strp  Generic drug:  blood sugar diagnostic  1 strip by Misc.(Non-Drug; Combo Route) route once daily.     valsartan 320 MG tablet  Commonly known as:  DIOVAN  Take 1 tablet (320 mg total) by mouth once daily.            Indwelling Lines/Drains at time of discharge:   Lines/Drains/Airways          None          Time spent on the discharge of patient: 30 minutes  Patient was seen and examined on the date of discharge and determined to be suitable for discharge.         Shelbi Morales MD  Department of Hospital Medicine  [facility]

## 2019-07-31 NOTE — NURSING
Discharge instructions and pxs given and verbalizes understanding.  Hep loc out and catheter intact.  No distress noted and going to eat lunch and then dc.  Waiting on pxs to be delivered also.

## 2019-07-31 NOTE — PLAN OF CARE
Problem: Obstructive Sleep Apnea Risk or Actual  Goal: Unobstructed Breathing During Sleep  Outcome: Ongoing (interventions implemented as appropriate)  Self-applies CPAP ad libitum.

## 2019-07-31 NOTE — PLAN OF CARE
07/31/19 0946   Final Note   Assessment Type Final Discharge Note   Anticipated Discharge Disposition Home   What phone number can be called within the next 1-3 days to see how you are doing after discharge? [phone]   Discharge plans and expectations educations in teach back method with documentation complete? Yes   Right Care Referral Info   Post Acute Recommendation No Care

## 2019-07-31 NOTE — PLAN OF CARE
Problem: Adult Inpatient Plan of Care  Goal: Plan of Care Review  Outcome: Ongoing (interventions implemented as appropriate)  Pt remains on RA. Cpap at bedside. No resp distress noted.

## 2019-08-01 ENCOUNTER — PATIENT OUTREACH (OUTPATIENT)
Dept: OTHER | Facility: OTHER | Age: 49
End: 2019-08-01

## 2019-08-01 NOTE — PROGRESS NOTES
Last 5 Patient Entered Readings                                      Current 30 Day Average: 141/90     Recent Readings 8/1/2019 8/1/2019 7/27/2019 7/27/2019 7/27/2019    SBP (mmHg) 112 112 141 141 123    DBP (mmHg) 73 78 89 99 83    Pulse 85 92 86 90 90          Last 6 Patient Entered Readings                                          Most Recent A1c: 6.2% on 7/29/2019  (Goal: 7%)     Recent Readings 7/18/2019 7/15/2019 7/12/2019 7/11/2019 7/10/2019    Blood Glucose (mg/dL) 161 123 143 129 150          Patient called to report he was in the hospital for 4 days with diverticulitis. He has since had pains bilaterally in the tops of his shoulders. Also endorses episodes of being SOB. No medication changes were made. BP is well controlled. Patient denies s/s of hypotension.   He is an  and is in the heat a lot.   Jose is to make a hospitalization follow up with Dr. Koenig to get a referral to a gastroenterologist for further follow up and a colonoscopy.    Asked patient to keep me informed if symptoms persist or progress. Will continue to monitor and will follow up 2-3 months.

## 2019-08-03 LAB
BACTERIA BLD CULT: NORMAL
BACTERIA BLD CULT: NORMAL

## 2019-08-05 ENCOUNTER — PATIENT OUTREACH (OUTPATIENT)
Dept: OTHER | Facility: OTHER | Age: 49
End: 2019-08-05

## 2019-08-05 NOTE — PROGRESS NOTES
Last 5 Patient Entered Readings                                      Current 30 Day Average: 136/87     Recent Readings 8/5/2019 8/5/2019 8/4/2019 8/4/2019 8/4/2019    SBP (mmHg) 134 121 107 99 104    DBP (mmHg) 88 90 70 62 68    Pulse 80 82 86 92 94          Last 6 Patient Entered Readings                                          Most Recent A1c: 6.2% on 7/29/2019  (Goal: 7%)     Recent Readings 8/4/2019 7/18/2019 7/15/2019 7/12/2019 7/11/2019    Blood Glucose (mg/dL) 101 161 123 143 129        Digital Medicine: Health  Follow Up    Lifestyle Modifications:    1.Dietary Modifications (Sodium intake <2,000mg/day, food labels, dining out):  Patient recently got out of the hospital for diverticulitis.  He states that he continues to watch his salt intake.  He says he is eating more fresh fruits and vegetables.  He also says he has been eating yogurt for the good bacteria in it.    2.Physical Activity: Patient reports that he has been very active at work.     3.Medication Therapy: Patient has been compliant with the medication regimen.    4.Patient has the following medication side effects/concerns: none reported  (Frequency/Alleviating factors/Precipitating factors, etc.)     Follow up with Mr. Tracy Gabriel completed. No further questions or concerns. Will continue to follow up to achieve health goals.

## 2019-08-12 DIAGNOSIS — I15.2 HYPERTENSION ASSOCIATED WITH DIABETES: ICD-10-CM

## 2019-08-12 DIAGNOSIS — E11.69 HYPERLIPIDEMIA ASSOCIATED WITH TYPE 2 DIABETES MELLITUS: ICD-10-CM

## 2019-08-12 DIAGNOSIS — E11.9 TYPE 2 DIABETES MELLITUS WITHOUT COMPLICATION, WITHOUT LONG-TERM CURRENT USE OF INSULIN: ICD-10-CM

## 2019-08-12 DIAGNOSIS — E78.5 HYPERLIPIDEMIA ASSOCIATED WITH TYPE 2 DIABETES MELLITUS: ICD-10-CM

## 2019-08-12 DIAGNOSIS — E11.59 HYPERTENSION ASSOCIATED WITH DIABETES: ICD-10-CM

## 2019-08-12 RX ORDER — VALSARTAN 320 MG/1
320 TABLET ORAL DAILY
Qty: 30 TABLET | Refills: 0 | Status: SHIPPED | OUTPATIENT
Start: 2019-08-12 | End: 2019-09-12 | Stop reason: SDUPTHER

## 2019-08-12 RX ORDER — METFORMIN HYDROCHLORIDE 500 MG/1
1000 TABLET, EXTENDED RELEASE ORAL
Qty: 60 TABLET | Refills: 0 | Status: SHIPPED | OUTPATIENT
Start: 2019-08-12 | End: 2019-09-16 | Stop reason: SDUPTHER

## 2019-08-12 RX ORDER — ATORVASTATIN CALCIUM 40 MG/1
40 TABLET, FILM COATED ORAL DAILY
Qty: 30 TABLET | Refills: 0 | Status: SHIPPED | OUTPATIENT
Start: 2019-08-12 | End: 2019-09-12 | Stop reason: SDUPTHER

## 2019-08-17 NOTE — PROGRESS NOTES
Subjective:       Patient ID: Tracy Gabriel is a 49 y.o. male.    Chief Complaint: Follow-up (Hopsital f/u/referral to GI)    HPI     The patient presents to the office today for follow-up. He was hospitalized at [facility] from 7/28-31/2019 with acute diverticulitis. He responded well to ciprofloxacin and metronidazole.    After his last visit with me in May, I recommended that fenofibrate re-added to atorvastatin, given his significant hypertriglyceridemia. I also recommended a coronary calcium score.  The patient said he would return to the office to discuss this, but he failed to schedule that follow-up visit.      Patient Active Problem List   Diagnosis    Controlled type 2 diabetes mellitus without complication, without long-term current use of insulin    Hyperlipidemia associated with type 2 diabetes mellitus    JOSE (obstructive sleep apnea)    Hypertension associated with diabetes    [place] cardiac risk 10-20% in next 10 years    Acute diverticulitis       Current Outpatient Medications:     amLODIPine (NORVASC) 10 MG tablet, Take 1 tablet (10 mg total) by mouth once daily., Disp: 90 tablet, Rfl: 1    atorvastatin (LIPITOR) 40 MG tablet, Take 1 tablet (40 mg total) by mouth once daily., Disp: 30 tablet, Rfl: 0    metFORMIN (GLUCOPHAGE-XR) 500 MG 24 hr tablet, Take 2 tablets (1,000 mg total) by mouth daily with breakfast., Disp: 60 tablet, Rfl: 0    ONETOUCH DELICA LANCETS 30 gauge Misc, 1 lancet by Misc.(Non-Drug; Combo Route) route once daily., Disp: 25 each, Rfl: 11    ONETOUCH VERIO Strp, 1 strip by Misc.(Non-Drug; Combo Route) route once daily., Disp: 25 strip, Rfl: 11    traMADol (ULTRAM) 50 mg tablet, Take 1 tablet (50 mg total) by mouth every 6 (six) hours as needed for Pain., Disp: 20 tablet, Rfl: 0    valsartan (DIOVAN) 320 MG tablet, Take 1 tablet (320 mg total) by mouth once daily., Disp: 30 tablet, Rfl: 0    The following portions of the patient's  "history were reviewed and updated as appropriate: allergies, past family history, past medical history, past social history and past surgical history.    Review of Systems   Constitutional: Negative for activity change and unexpected weight change.   HENT: Negative for hearing loss, rhinorrhea and trouble swallowing.    Eyes: Negative for discharge and visual disturbance.   Respiratory: Negative for wheezing.    Cardiovascular: Negative for chest pain and palpitations.   Gastrointestinal: Negative for blood in stool, constipation, diarrhea and vomiting.   Endocrine: Negative for polydipsia and polyuria.   Genitourinary: Negative for difficulty urinating, hematuria and urgency.   Musculoskeletal: Negative for arthralgias, joint swelling and neck pain.   Neurological: Negative for weakness and headaches.   Psychiatric/Behavioral: Negative for confusion and dysphoric mood.       Objective:      /78 (BP Location: Left arm, Patient Position: Sitting, BP Method: Large (Automatic))   Pulse 71   Resp 16   Ht 5' 6" (1.676 m)   Wt 95 kg (209 lb 8 oz)   BMI 33.81 kg/m²     Physical Exam   Constitutional: He is oriented to person, place, and time. He appears well-developed and well-nourished.   HENT:   Head: Normocephalic and atraumatic.   Eyes: Conjunctivae are normal. No scleral icterus.   Cardiovascular:   Pulses:       Dorsalis pedis pulses are 2+ on the right side, and 2+ on the left side.        Posterior tibial pulses are 2+ on the right side, and 2+ on the left side.   Abdominal: Soft. Bowel sounds are normal. He exhibits no mass. There is no tenderness.   Musculoskeletal:        Right foot: There is normal range of motion and no deformity.        Left foot: There is normal range of motion and no deformity.   Feet:   Right Foot:   Protective Sensation: 9 sites tested. 9 sites sensed.   Skin Integrity: Negative for erythema, warmth or dry skin.   Left Foot:   Protective Sensation: 9 sites tested. 9 sites " "sensed.   Skin Integrity: Negative for erythema, callus or dry skin.   Neurological: He is alert and oriented to person, place, and time.   Skin: Skin is warm.   Psychiatric: He has a normal mood and affect.   Vitals reviewed.      Protective Sensation (w/ 10 gram monofilament):  Right: Intact  Left: Intact    Visual Inspection:  Normal -  Bilateral    Pedal Pulses:   Right: Present  Left: Present    Posterior tibialis:   Right:Present  Left: Present      Assessment:       1. Acute diverticulitis, reesolved    2. Controlled type 2 diabetes mellitus without complication, without long-term current use of insulin     3. Hypertension associated with diabetes     4. Hyperlipidemia associated with type 2 diabetes mellitus     5. [place] cardiac risk 10-20% in next 10 years     6. JOSE (obstructive sleep apnea)     7. Colon cancer screening     8. At risk for coronary artery disease         Plan:       Foott exam today.  Offered shingles and pneumococcal vaccines.   Influenza vaccine when available.  Recommend colonoscopy and another 3 to 5 weeks.  Fenofibrate added; coronary calcium score ordered.    RTC 3 months w/previsit labs.        "This note will not be shared with the patient."  "

## 2019-08-19 ENCOUNTER — OFFICE VISIT (OUTPATIENT)
Dept: FAMILY MEDICINE | Facility: CLINIC | Age: 49
End: 2019-08-19
Attending: FAMILY MEDICINE
Payer: COMMERCIAL

## 2019-08-19 VITALS
HEART RATE: 71 BPM | WEIGHT: 209.5 LBS | RESPIRATION RATE: 16 BRPM | BODY MASS INDEX: 33.67 KG/M2 | HEIGHT: 66 IN | DIASTOLIC BLOOD PRESSURE: 78 MMHG | SYSTOLIC BLOOD PRESSURE: 126 MMHG

## 2019-08-19 DIAGNOSIS — E78.5 HYPERLIPIDEMIA ASSOCIATED WITH TYPE 2 DIABETES MELLITUS: Chronic | ICD-10-CM

## 2019-08-19 DIAGNOSIS — E11.9 CONTROLLED TYPE 2 DIABETES MELLITUS WITHOUT COMPLICATION, WITHOUT LONG-TERM CURRENT USE OF INSULIN: Chronic | ICD-10-CM

## 2019-08-19 DIAGNOSIS — Z12.11 COLON CANCER SCREENING: ICD-10-CM

## 2019-08-19 DIAGNOSIS — Z91.89 AT RISK FOR CORONARY ARTERY DISEASE: ICD-10-CM

## 2019-08-19 DIAGNOSIS — K57.92 ACUTE DIVERTICULITIS: Primary | ICD-10-CM

## 2019-08-19 DIAGNOSIS — I15.2 HYPERTENSION ASSOCIATED WITH DIABETES: Chronic | ICD-10-CM

## 2019-08-19 DIAGNOSIS — E11.69 HYPERLIPIDEMIA ASSOCIATED WITH TYPE 2 DIABETES MELLITUS: Chronic | ICD-10-CM

## 2019-08-19 DIAGNOSIS — Z91.89 FRAMINGHAM CARDIAC RISK 10-20% IN NEXT 10 YEARS: ICD-10-CM

## 2019-08-19 DIAGNOSIS — G47.33 OSA (OBSTRUCTIVE SLEEP APNEA): Chronic | ICD-10-CM

## 2019-08-19 DIAGNOSIS — E11.59 HYPERTENSION ASSOCIATED WITH DIABETES: Chronic | ICD-10-CM

## 2019-08-19 PROCEDURE — 99214 PR OFFICE/OUTPT VISIT, EST, LEVL IV, 30-39 MIN: ICD-10-PCS | Mod: 25,S$GLB,, | Performed by: FAMILY MEDICINE

## 2019-08-19 PROCEDURE — 3078F PR MOST RECENT DIASTOLIC BLOOD PRESSURE < 80 MM HG: ICD-10-PCS | Mod: CPTII,S$GLB,, | Performed by: FAMILY MEDICINE

## 2019-08-19 PROCEDURE — 3008F PR BODY MASS INDEX (BMI) DOCUMENTED: ICD-10-PCS | Mod: CPTII,S$GLB,, | Performed by: FAMILY MEDICINE

## 2019-08-19 PROCEDURE — 3074F SYST BP LT 130 MM HG: CPT | Mod: CPTII,S$GLB,, | Performed by: FAMILY MEDICINE

## 2019-08-19 PROCEDURE — 90471 PNEUMOCOCCAL POLYSACCHARIDE VACCINE 23-VALENT =>2YO SQ IM: ICD-10-PCS | Mod: S$GLB,,, | Performed by: FAMILY MEDICINE

## 2019-08-19 PROCEDURE — 3008F BODY MASS INDEX DOCD: CPT | Mod: CPTII,S$GLB,, | Performed by: FAMILY MEDICINE

## 2019-08-19 PROCEDURE — 90732 PPSV23 VACC 2 YRS+ SUBQ/IM: CPT | Mod: S$GLB,,, | Performed by: FAMILY MEDICINE

## 2019-08-19 PROCEDURE — 99999 PR PBB SHADOW E&M-EST. PATIENT-LVL IV: CPT | Mod: PBBFAC,,, | Performed by: FAMILY MEDICINE

## 2019-08-19 PROCEDURE — 90471 IMMUNIZATION ADMIN: CPT | Mod: S$GLB,,, | Performed by: FAMILY MEDICINE

## 2019-08-19 PROCEDURE — 3044F PR MOST RECENT HEMOGLOBIN A1C LEVEL <7.0%: ICD-10-PCS | Mod: CPTII,S$GLB,, | Performed by: FAMILY MEDICINE

## 2019-08-19 PROCEDURE — 3078F DIAST BP <80 MM HG: CPT | Mod: CPTII,S$GLB,, | Performed by: FAMILY MEDICINE

## 2019-08-19 PROCEDURE — 90732 PNEUMOCOCCAL POLYSACCHARIDE VACCINE 23-VALENT =>2YO SQ IM: ICD-10-PCS | Mod: S$GLB,,, | Performed by: FAMILY MEDICINE

## 2019-08-19 PROCEDURE — 99214 OFFICE O/P EST MOD 30 MIN: CPT | Mod: 25,S$GLB,, | Performed by: FAMILY MEDICINE

## 2019-08-19 PROCEDURE — 3044F HG A1C LEVEL LT 7.0%: CPT | Mod: CPTII,S$GLB,, | Performed by: FAMILY MEDICINE

## 2019-08-19 PROCEDURE — 3074F PR MOST RECENT SYSTOLIC BLOOD PRESSURE < 130 MM HG: ICD-10-PCS | Mod: CPTII,S$GLB,, | Performed by: FAMILY MEDICINE

## 2019-08-19 PROCEDURE — 99999 PR PBB SHADOW E&M-EST. PATIENT-LVL IV: ICD-10-PCS | Mod: PBBFAC,,, | Performed by: FAMILY MEDICINE

## 2019-08-19 RX ORDER — FENOFIBRATE 160 MG/1
160 TABLET ORAL DAILY
Qty: 30 TABLET | Refills: 2 | Status: SHIPPED | OUTPATIENT
Start: 2019-08-19 | End: 2019-11-16 | Stop reason: SDUPTHER

## 2019-08-19 NOTE — PATIENT INSTRUCTIONS
Jose,     We are always striving for excellence. Should you receive a patient experience survey electronically or by mail, we would appreciate if you would take a few moments to give us your feedback. These surveys let us know our strengths as well as areas of opportunity for improvement to better serve you.    Thank you for your time,  Leann Elena LPN    Your test results will be communicated to you via : [facility], Telephone or Letter.   If you have not received your test results in one week, please contact the clinic at [phone].

## 2019-08-27 DIAGNOSIS — Z12.11 SPECIAL SCREENING FOR MALIGNANT NEOPLASMS, COLON: Primary | ICD-10-CM

## 2019-08-27 RX ORDER — POLYETHYLENE GLYCOL 3350, SODIUM SULFATE ANHYDROUS, SODIUM BICARBONATE, SODIUM CHLORIDE, POTASSIUM CHLORIDE 236; 22.74; 6.74; 5.86; 2.97 G/4L; G/4L; G/4L; G/4L; G/4L
4 POWDER, FOR SOLUTION ORAL ONCE
Qty: 4000 ML | Refills: 0 | Status: SHIPPED | OUTPATIENT
Start: 2019-08-27 | End: 2019-08-27

## 2019-09-03 ENCOUNTER — PATIENT OUTREACH (OUTPATIENT)
Dept: OTHER | Facility: OTHER | Age: 49
End: 2019-09-03

## 2019-09-03 NOTE — PROGRESS NOTES
Last 5 Patient Entered Readings                                      Current 30 Day Average: 121/80     Recent Readings 9/3/2019 9/2/2019 9/2/2019 9/2/2019 9/1/2019    SBP (mmHg) 117 112 122 123 126    DBP (mmHg) 77 75 84 84 76    Pulse 82 71 75 67 98          Last 6 Patient Entered Readings                                          Most Recent A1c: 6.2% on 7/29/2019  (Goal: 7%)     Recent Readings 8/4/2019 7/18/2019 7/15/2019 7/12/2019 7/11/2019    Blood Glucose (mg/dL) 101 161 123 143 129          Digital Medicine: Health  Follow Up    Left voicemail to follow up with Mr. Tracy Gabriel.  Current BP average 121/80 mmHg is at goal, 130/80.

## 2019-09-05 NOTE — PROGRESS NOTES
"Last 5 Patient Entered Readings                                      Current 30 Day Average: 122/80     Recent Readings 9/5/2019 9/5/2019 9/4/2019 9/4/2019 9/3/2019    SBP (mmHg) 111 121 106 108 130    DBP (mmHg) 71 70 74 66 84    Pulse 78 78 84 82 78        Digital Medicine: Health  Follow Up    Lifestyle Modifications:    1.Dietary Modifications (Sodium intake <2,000mg/day, food labels, dining out): Patient reports that he has been eating "" and has lost about 15 lbs in the past month.  He says that he his cutting back on fatty foods and increasing his fiber intake.    2.Physical Activity: Patient says that he remains active at work, but has also joined [facility] and has gone to work out at the gym there a couple of times.    3.Medication Therapy: Patient has been compliant with the medication regimen.    4.Patient has the following medication side effects/concerns: none reported  (Frequency/Alleviating factors/Precipitating factors, etc.)     Follow up with Mr. Tracy Gabriel completed. No further questions or concerns. Will continue to follow up to achieve health goals.      "

## 2019-09-08 DIAGNOSIS — E11.9 TYPE 2 DIABETES MELLITUS: ICD-10-CM

## 2019-09-09 DIAGNOSIS — E11.9 TYPE 2 DIABETES MELLITUS: ICD-10-CM

## 2019-09-10 DIAGNOSIS — E11.9 TYPE 2 DIABETES MELLITUS: ICD-10-CM

## 2019-09-12 DIAGNOSIS — E11.59 HYPERTENSION ASSOCIATED WITH DIABETES: ICD-10-CM

## 2019-09-12 DIAGNOSIS — E11.69 HYPERLIPIDEMIA ASSOCIATED WITH TYPE 2 DIABETES MELLITUS: ICD-10-CM

## 2019-09-12 DIAGNOSIS — I15.2 HYPERTENSION ASSOCIATED WITH DIABETES: ICD-10-CM

## 2019-09-12 DIAGNOSIS — E78.5 HYPERLIPIDEMIA ASSOCIATED WITH TYPE 2 DIABETES MELLITUS: ICD-10-CM

## 2019-09-12 RX ORDER — VALSARTAN 320 MG/1
320 TABLET ORAL DAILY
Qty: 30 TABLET | Refills: 0 | Status: SHIPPED | OUTPATIENT
Start: 2019-09-12 | End: 2019-10-08 | Stop reason: SDUPTHER

## 2019-09-12 RX ORDER — ATORVASTATIN CALCIUM 40 MG/1
40 TABLET, FILM COATED ORAL DAILY
Qty: 30 TABLET | Refills: 0 | Status: SHIPPED | OUTPATIENT
Start: 2019-09-12 | End: 2019-10-08 | Stop reason: SDUPTHER

## 2019-09-16 DIAGNOSIS — E11.9 TYPE 2 DIABETES MELLITUS WITHOUT COMPLICATION, WITHOUT LONG-TERM CURRENT USE OF INSULIN: ICD-10-CM

## 2019-09-16 RX ORDER — METFORMIN HYDROCHLORIDE 500 MG/1
1000 TABLET, EXTENDED RELEASE ORAL
Qty: 60 TABLET | Refills: 1 | Status: SHIPPED | OUTPATIENT
Start: 2019-09-16 | End: 2020-04-23 | Stop reason: SDUPTHER

## 2019-10-03 ENCOUNTER — PATIENT OUTREACH (OUTPATIENT)
Dept: OTHER | Facility: OTHER | Age: 49
End: 2019-10-03

## 2019-10-03 NOTE — PROGRESS NOTES
Digital Medicine: Health  Follow-Up    Patient states that he is feeling good.  He says he continues to lose weight and his weight is 190 lbs.    The history is provided by the patient.     Follow Up  Follow-up reason(s): reading review      Readings are trending down           Diet:   Patient reports eating or drinking the following: fruit, water, fresh vegetables and lean proteins, breadHe has the following dietary restrictions: weight-loss, low sodium diet and diverticulitis    Physical Activity:   When asked if exercising, patient responded: no    Patient states that he remains very active at work.    Medication Adherence:   He misses doses: never        SDOH    INTERVENTION(S)  encouragement/support    PLAN  patient verbalizes understanding    Commended patient for making healthy lifestyle choices.          Topic    Eye Exam        Last 5 Patient Entered Readings                                      Current 30 Day Average: 119/77     Recent Readings 10/2/2019 10/2/2019 10/1/2019 10/1/2019 10/1/2019    SBP (mmHg) 109 113 102 105 117    DBP (mmHg) 74 73 66 70 71    Pulse 103 91 81 92 90        Last 6 Patient Entered Readings                                          Most Recent A1c: 6.2% on 7/29/2019  (Goal: 7%)     Recent Readings 8/4/2019 7/18/2019 7/15/2019 7/12/2019 7/11/2019    Blood Glucose (mg/dL) 101 161 123 143 129

## 2019-10-08 DIAGNOSIS — E78.5 HYPERLIPIDEMIA ASSOCIATED WITH TYPE 2 DIABETES MELLITUS: ICD-10-CM

## 2019-10-08 DIAGNOSIS — E11.69 HYPERLIPIDEMIA ASSOCIATED WITH TYPE 2 DIABETES MELLITUS: ICD-10-CM

## 2019-10-08 DIAGNOSIS — E11.9 CONTROLLED TYPE 2 DIABETES MELLITUS WITHOUT COMPLICATION, WITHOUT LONG-TERM CURRENT USE OF INSULIN: Primary | Chronic | ICD-10-CM

## 2019-10-08 DIAGNOSIS — E11.59 HYPERTENSION ASSOCIATED WITH DIABETES: ICD-10-CM

## 2019-10-08 DIAGNOSIS — I15.2 HYPERTENSION ASSOCIATED WITH DIABETES: ICD-10-CM

## 2019-10-08 RX ORDER — VALSARTAN 320 MG/1
320 TABLET ORAL DAILY
Qty: 30 TABLET | Refills: 0 | Status: SHIPPED | OUTPATIENT
Start: 2019-10-08 | End: 2019-11-11 | Stop reason: SDUPTHER

## 2019-10-08 RX ORDER — ATORVASTATIN CALCIUM 40 MG/1
40 TABLET, FILM COATED ORAL DAILY
Qty: 30 TABLET | Refills: 0 | Status: SHIPPED | OUTPATIENT
Start: 2019-10-08 | End: 2019-11-11 | Stop reason: SDUPTHER

## 2019-10-08 NOTE — TELEPHONE ENCOUNTER
Medications refilled.  Please remind patient he is scheduled to return to our clinic in mid November with pre visit laboratory testing.  Also, he is not yet schedule the coronary calcium score I recommended on 2 previous occasions.

## 2019-10-10 ENCOUNTER — PATIENT MESSAGE (OUTPATIENT)
Dept: FAMILY MEDICINE | Facility: CLINIC | Age: 49
End: 2019-10-10

## 2019-10-10 ENCOUNTER — LAB VISIT (OUTPATIENT)
Dept: LAB | Facility: HOSPITAL | Age: 49
End: 2019-10-10
Attending: FAMILY MEDICINE
Payer: COMMERCIAL

## 2019-10-10 DIAGNOSIS — I15.2 HYPERTENSION ASSOCIATED WITH DIABETES: ICD-10-CM

## 2019-10-10 DIAGNOSIS — E78.5 HYPERLIPIDEMIA ASSOCIATED WITH TYPE 2 DIABETES MELLITUS: ICD-10-CM

## 2019-10-10 DIAGNOSIS — E11.9 CONTROLLED TYPE 2 DIABETES MELLITUS WITHOUT COMPLICATION, WITHOUT LONG-TERM CURRENT USE OF INSULIN: Chronic | ICD-10-CM

## 2019-10-10 DIAGNOSIS — E11.69 HYPERLIPIDEMIA ASSOCIATED WITH TYPE 2 DIABETES MELLITUS: ICD-10-CM

## 2019-10-10 DIAGNOSIS — Z91.89 FRAMINGHAM CARDIAC RISK 10-20% IN NEXT 10 YEARS: ICD-10-CM

## 2019-10-10 DIAGNOSIS — E11.59 HYPERTENSION ASSOCIATED WITH DIABETES: ICD-10-CM

## 2019-10-10 LAB
ALBUMIN SERPL BCP-MCNC: 4.1 G/DL (ref 3.5–5.2)
ALP SERPL-CCNC: 41 U/L (ref 55–135)
ALT SERPL W/O P-5'-P-CCNC: 31 U/L (ref 10–44)
ANION GAP SERPL CALC-SCNC: 7 MMOL/L (ref 8–16)
AST SERPL-CCNC: 24 U/L (ref 10–40)
BILIRUB SERPL-MCNC: 0.3 MG/DL (ref 0.1–1)
BUN SERPL-MCNC: 12 MG/DL (ref 6–20)
CALCIUM SERPL-MCNC: 9.9 MG/DL (ref 8.7–10.5)
CHLORIDE SERPL-SCNC: 107 MMOL/L (ref 95–110)
CHOLEST SERPL-MCNC: 165 MG/DL (ref 120–199)
CHOLEST/HDLC SERPL: 3.8 {RATIO} (ref 2–5)
CO2 SERPL-SCNC: 26 MMOL/L (ref 23–29)
CREAT SERPL-MCNC: 1 MG/DL (ref 0.5–1.4)
EST. GFR  (AFRICAN AMERICAN): >60 ML/MIN/1.73 M^2
EST. GFR  (NON AFRICAN AMERICAN): >60 ML/MIN/1.73 M^2
ESTIMATED AVG GLUCOSE: 111 MG/DL (ref 68–131)
GLUCOSE SERPL-MCNC: 97 MG/DL (ref 70–110)
HBA1C MFR BLD HPLC: 5.5 % (ref 4–5.6)
HDLC SERPL-MCNC: 43 MG/DL (ref 40–75)
HDLC SERPL: 26.1 % (ref 20–50)
LDLC SERPL CALC-MCNC: 85.2 MG/DL (ref 63–159)
NONHDLC SERPL-MCNC: 122 MG/DL
POTASSIUM SERPL-SCNC: 4 MMOL/L (ref 3.5–5.1)
PROT SERPL-MCNC: 7.2 G/DL (ref 6–8.4)
SODIUM SERPL-SCNC: 140 MMOL/L (ref 136–145)
TRIGL SERPL-MCNC: 184 MG/DL (ref 30–150)

## 2019-10-10 PROCEDURE — 36415 COLL VENOUS BLD VENIPUNCTURE: CPT | Mod: PO

## 2019-10-10 PROCEDURE — 83036 HEMOGLOBIN GLYCOSYLATED A1C: CPT

## 2019-10-10 PROCEDURE — 80061 LIPID PANEL: CPT

## 2019-10-10 PROCEDURE — 80053 COMPREHEN METABOLIC PANEL: CPT

## 2019-10-18 ENCOUNTER — ANESTHESIA (OUTPATIENT)
Dept: ENDOSCOPY | Facility: HOSPITAL | Age: 49
End: 2019-10-18
Payer: COMMERCIAL

## 2019-10-18 ENCOUNTER — HOSPITAL ENCOUNTER (OUTPATIENT)
Facility: HOSPITAL | Age: 49
Discharge: HOME | End: 2019-10-18
Attending: COLON & RECTAL SURGERY | Admitting: COLON & RECTAL SURGERY
Payer: COMMERCIAL

## 2019-10-18 ENCOUNTER — ANESTHESIA EVENT (OUTPATIENT)
Dept: ENDOSCOPY | Facility: HOSPITAL | Age: 49
End: 2019-10-18
Payer: COMMERCIAL

## 2019-10-18 VITALS
TEMPERATURE: 99 F | RESPIRATION RATE: 18 BRPM | DIASTOLIC BLOOD PRESSURE: 75 MMHG | HEART RATE: 69 BPM | SYSTOLIC BLOOD PRESSURE: 116 MMHG | OXYGEN SATURATION: 97 % | HEIGHT: 66 IN | BODY MASS INDEX: 31.34 KG/M2 | WEIGHT: 195 LBS

## 2019-10-18 DIAGNOSIS — Z12.11 SCREEN FOR COLON CANCER: Primary | ICD-10-CM

## 2019-10-18 LAB — POCT GLUCOSE: 76 MG/DL (ref 70–110)

## 2019-10-18 PROCEDURE — 63600175 PHARM REV CODE 636 W HCPCS: Performed by: COLON & RECTAL SURGERY

## 2019-10-18 PROCEDURE — E9220 PRA ENDO ANESTHESIA: HCPCS | Mod: ,,, | Performed by: NURSE ANESTHETIST, CERTIFIED REGISTERED

## 2019-10-18 PROCEDURE — 82962 GLUCOSE BLOOD TEST: CPT | Performed by: COLON & RECTAL SURGERY

## 2019-10-18 PROCEDURE — 63600175 PHARM REV CODE 636 W HCPCS: Performed by: ANESTHESIOLOGY

## 2019-10-18 PROCEDURE — 37000009 HC ANESTHESIA EA ADD 15 MINS: Performed by: COLON & RECTAL SURGERY

## 2019-10-18 PROCEDURE — 37000008 HC ANESTHESIA 1ST 15 MINUTES: Performed by: COLON & RECTAL SURGERY

## 2019-10-18 PROCEDURE — E9220 PRA ENDO ANESTHESIA: ICD-10-PCS | Mod: ,,, | Performed by: NURSE ANESTHETIST, CERTIFIED REGISTERED

## 2019-10-18 PROCEDURE — 63600175 PHARM REV CODE 636 W HCPCS: Performed by: NURSE ANESTHETIST, CERTIFIED REGISTERED

## 2019-10-18 PROCEDURE — G0121 COLON CA SCRN NOT HI RSK IND: HCPCS | Mod: ,,, | Performed by: COLON & RECTAL SURGERY

## 2019-10-18 PROCEDURE — 25000003 PHARM REV CODE 250: Performed by: NURSE ANESTHETIST, CERTIFIED REGISTERED

## 2019-10-18 PROCEDURE — G0121 COLON CA SCRN NOT HI RSK IND: HCPCS | Performed by: COLON & RECTAL SURGERY

## 2019-10-18 PROCEDURE — G0121 COLON CA SCRN NOT HI RSK IND: ICD-10-PCS | Mod: ,,, | Performed by: COLON & RECTAL SURGERY

## 2019-10-18 RX ORDER — GLYCOPYRROLATE 0.2 MG/ML
INJECTION INTRAMUSCULAR; INTRAVENOUS
Status: DISCONTINUED | OUTPATIENT
Start: 2019-10-18 | End: 2019-10-18

## 2019-10-18 RX ORDER — PHENYLEPHRINE HYDROCHLORIDE 10 MG/ML
INJECTION INTRAVENOUS
Status: DISCONTINUED | OUTPATIENT
Start: 2019-10-18 | End: 2019-10-18

## 2019-10-18 RX ORDER — LIDOCAINE HCL/PF 100 MG/5ML
SYRINGE (ML) INTRAVENOUS
Status: DISCONTINUED | OUTPATIENT
Start: 2019-10-18 | End: 2019-10-18

## 2019-10-18 RX ORDER — SODIUM CHLORIDE 9 MG/ML
INJECTION, SOLUTION INTRAVENOUS CONTINUOUS
Status: DISCONTINUED | OUTPATIENT
Start: 2019-10-18 | End: 2019-10-18 | Stop reason: HOSPADM

## 2019-10-18 RX ORDER — PROPOFOL 10 MG/ML
VIAL (ML) INTRAVENOUS CONTINUOUS PRN
Status: DISCONTINUED | OUTPATIENT
Start: 2019-10-18 | End: 2019-10-18

## 2019-10-18 RX ORDER — PROPOFOL 10 MG/ML
VIAL (ML) INTRAVENOUS
Status: DISCONTINUED | OUTPATIENT
Start: 2019-10-18 | End: 2019-10-18

## 2019-10-18 RX ADMIN — PROPOFOL 20 MG: 10 INJECTION, EMULSION INTRAVENOUS at 12:10

## 2019-10-18 RX ADMIN — PROPOFOL 50 MG: 10 INJECTION, EMULSION INTRAVENOUS at 12:10

## 2019-10-18 RX ADMIN — PROPOFOL 80 MG: 10 INJECTION, EMULSION INTRAVENOUS at 12:10

## 2019-10-18 RX ADMIN — PHENYLEPHRINE HYDROCHLORIDE 100 MCG: 10 INJECTION INTRAVENOUS at 12:10

## 2019-10-18 RX ADMIN — SODIUM CHLORIDE: 0.9 INJECTION, SOLUTION INTRAVENOUS at 11:10

## 2019-10-18 RX ADMIN — SODIUM CHLORIDE: 0.9 INJECTION, SOLUTION INTRAVENOUS at 12:10

## 2019-10-18 RX ADMIN — LIDOCAINE HYDROCHLORIDE 100 MG: 20 INJECTION, SOLUTION INTRAVENOUS at 12:10

## 2019-10-18 RX ADMIN — PROPOFOL 30 MG: 10 INJECTION, EMULSION INTRAVENOUS at 12:10

## 2019-10-18 RX ADMIN — PROPOFOL 200 MCG/KG/MIN: 10 INJECTION, EMULSION INTRAVENOUS at 12:10

## 2019-10-18 RX ADMIN — GLYCOPYRROLATE 0.2 MG: 0.2 INJECTION, SOLUTION INTRAMUSCULAR; INTRAVENOUS at 12:10

## 2019-10-18 NOTE — ANESTHESIA PREPROCEDURE EVALUATION
10/18/2019  Tracy Gabriel is a 49 y.o., male.  Pre-operative evaluation for Procedure(s) (LRB):  COLONOSCOPY (N/A)    Tracy Gabriel is a 49 y.o. male     Patient Active Problem List   Diagnosis    Controlled type 2 diabetes mellitus without complication, without long-term current use of insulin    Hyperlipidemia associated with type 2 diabetes mellitus    JOSE (obstructive sleep apnea)    Hypertension associated with diabetes    [place] cardiac risk 10-20% in next 10 years    Acute diverticulitis    Screen for colon cancer       Review of patient's allergies indicates:  No Known Allergies    No current facility-administered medications on file prior to encounter.      Current Outpatient Medications on File Prior to Encounter   Medication Sig Dispense Refill    amLODIPine (NORVASC) 10 MG tablet Take 1 tablet (10 mg total) by mouth once daily. 90 tablet 1    fenofibrate 160 MG Tab Take 1 tablet (160 mg total) by mouth once daily. 30 tablet 2    ONETOUCH VERIO Strp 1 strip by Misc.(Non-Drug; Combo Route) route once daily. 25 strip 11    ONETOUCH DELICA LANCETS 30 gauge Misc 1 lancet by Misc.(Non-Drug; Combo Route) route once daily. 25 each 11    traMADol (ULTRAM) 50 mg tablet Take 1 tablet (50 mg total) by mouth every 6 (six) hours as needed for Pain. 20 tablet 0       History reviewed. No pertinent surgical history.    Social History     Socioeconomic History    Marital status:      Spouse name: Not on file    Number of children: 0    Years of education: Not on file    Highest education level: Not on file   Occupational History    Occupation: sales   Social Needs    Financial resource strain: Not hard at all    Food insecurity:     Worry: Never true     Inability: Never true    Transportation needs:     Medical: No     Non-medical: No   Tobacco Use    Smoking status:  Former Smoker     Packs/day: 1.00     Years: 14.00     Pack years: 14.00     Types: Cigarettes     Start date: 1987     Last attempt to quit: 2001     Years since quittin.6    Smokeless tobacco: Never Used   Substance and Sexual Activity    Alcohol use: Yes     Alcohol/week: 20.0 standard drinks     Types: 20 Standard drinks or equivalent per week     Frequency: 4 or more times a week     Drinks per session: 5 or 6     Comment: sociallyy    Drug use: Never    Sexual activity: Not on file   Lifestyle    Physical activity:     Days per week: 4 days     Minutes per session: 30 min    Stress: To some extent   Relationships    Social connections:     Talks on phone: Not on file     Gets together: Not on file     Attends Synagogue service: Not on file     Active member of club or organization: Not on file     Attends meetings of clubs or organizations: Not on file     Relationship status: Not on file   Other Topics Concern    Not on file   Social History Narrative    The patient does exercise regularly (bikes 4 days/wk).      He is not satisfied with weight.    Rates diet as good (vegetarian).    He does drink at least 1/2 gallon water daily.    He drinks 2 coffee/tea/caffeine-containing soft drinks daily.    Total sleep time at night is 6 hours.    He works 60 hours per week.    He does wear seat belts.    Hobbies include biking, baseball.         CBC: No results for input(s): WBC, RBC, HGB, HCT, PLT, MCV, MCH, MCHC in the last 72 hours.    CMP: No results for input(s): NA, K, CL, CO2, BUN, CREATININE, GLU, MG, PHOS, CALCIUM, ALBUMIN, PROT, ALKPHOS, ALT, AST, BILITOT in the last 72 hours.    INR  No results for input(s): PT, INR, PROTIME, APTT in the last 72 hours.        Diagnostic Studies:      EKD Echo:  No results found for this or any previous visit.      Anesthesia Evaluation    I have reviewed the Patient Summary Reports.     I have reviewed the Medications.     Review of  Systems  Anesthesia Hx:  No problems with previous Anesthesia  History of prior surgery of interest to airway management or planning: Previous anesthesia: General Denies Family Hx of Anesthesia complications.   Denies Personal Hx of Anesthesia complications.   Cardiovascular:   Exercise tolerance: good Hypertension    Pulmonary:   Sleep Apnea, CPAP    Endocrine:   Diabetes        Physical Exam  General:  Well nourished    Airway/Jaw/Neck:  Airway Findings: Mouth Opening: Normal Tongue: Normal  General Airway Assessment: Adult  Mallampati: II  Improves to II with phonation.  TM Distance: Normal, at least 6 cm  Jaw/Neck Findings:  Neck ROM: Normal ROM      Dental:  Dental Findings: In tact   Chest/Lungs:  Chest/Lungs Findings: Clear to auscultation, Normal Respiratory Rate     Heart/Vascular:  Heart Findings: Rate: Normal  Rhythm: Regular Rhythm  Sounds: Normal        Mental Status:  Mental Status Findings:  Cooperative, Alert and Oriented         Anesthesia Plan  Type of Anesthesia, risks & benefits discussed:  Anesthesia Type:  general  Patient's Preference:   Intra-op Monitoring Plan: standard ASA monitors  Intra-op Monitoring Plan Comments:   Post Op Pain Control Plan: multimodal analgesia  Post Op Pain Control Plan Comments:   Induction:    Beta Blocker:         Informed Consent: Patient understands risks and agrees with Anesthesia plan.  Questions answered. Anesthesia consent signed with patient.  ASA Score: 2     Day of Surgery Review of History & Physical:    H&P update referred to the surgeon.         Ready For Surgery From Anesthesia Perspective.

## 2019-10-18 NOTE — [employer] PATIENT INSTRUCTIONS
Discharge Summary/Instructions after an Endoscopic Procedure  Patient Name: Tracy Gabriel  Patient MRN: [id number]  Patient YOB: 1970 Friday, October 18, 2019  Jung Potter MD  RESTRICTIONS:  During your procedure today, you received medications for sedation.  These   medications may affect your judgment, balance and coordination.  Therefore,   for 24 hours, you have the following restrictions:   - DO NOT drive a car, operate machinery, make legal/financial decisions,   sign important papers or drink alcohol.    ACTIVITY:  Today: no heavy lifting, straining or running due to procedural   sedation/anesthesia.  The following day: return to full activity including work.  DIET:  Eat and drink normally unless instructed otherwise.     TREATMENT FOR COMMON SIDE EFFECTS:  - Mild abdominal pain, nausea, belching, bloating or excessive gas:  rest,   eat lightly and use a heating pad.  - Sore Throat: treat with throat lozenges and/or gargle with warm salt   water.  - Because air was used during the procedure, expelling large amounts of air   from your rectum or belching is normal.  - If a bowel prep was taken, you may not have a bowel movement for 1-3 days.    This is normal.  SYMPTOMS TO WATCH FOR AND REPORT TO YOUR PHYSICIAN:  1. Abdominal pain or bloating, other than gas cramps.  2. Chest pain.  3. Back pain.  4. Signs of infection such as: chills or fever occurring within 24 hours   after the procedure.  5. Rectal bleeding, which would show as bright red, maroon, or black stools.   (A tablespoon of blood from the rectum is not serious, especially if   hemorrhoids are present.)  6. Vomiting.  7. Weakness or dizziness.  GO DIRECTLY TO THE NEAREST EMERGENCY ROOM IF YOU HAVE ANY OF THE FOLLOWING:      Difficulty breathing              Chills and/or fever over 101 F   Persistent vomiting and/or vomiting blood   Severe abdominal pain   Severe chest pain   Black, tarry stools   Bleeding- more than one  tablespoon   Any other symptom or condition that you feel may need urgent attention  Your doctor recommends these additional instructions:  If any biopsies were taken, your doctors clinic will contact you in 1 to 2   weeks with any results.  - Discharge patient to home (ambulatory).   - Resume previous diet.   - Continue present medications.   - Await pathology results.   - Repeat colonoscopy in 10 years for screening purposes.  For questions, problems or results please call your physician - Jung Potter MD at Work:  [phone].  [facility], EMERGENCY ROOM PHONE NUMBER: [phone]  IF A COMPLICATION OR EMERGENCY SITUATION ARISES AND YOU ARE UNABLE TO REACH   YOUR PHYSICIAN - GO DIRECTLY TO THE EMERGENCY ROOM.  Jung Potter MD  10/18/2019 12:27:47 PM  This report has been verified and signed electronically.  [employer]

## 2019-10-18 NOTE — ANESTHESIA POSTPROCEDURE EVALUATION
Anesthesia Post Evaluation    Patient: Tracy Gabriel    Procedure(s) Performed: Procedure(s) (LRB):  COLONOSCOPY (N/A)    Final Anesthesia Type: general  Patient location during evaluation: GI PACU  Patient participation: Yes- Able to Participate  Level of consciousness: awake and alert  Post-procedure vital signs: reviewed and stable  Pain management: adequate  Airway patency: patent  PONV status at discharge: No PONV  Anesthetic complications: no      Cardiovascular status: blood pressure returned to baseline  Respiratory status: unassisted  Hydration status: euvolemic  Follow-up not needed.          Vitals Value Taken Time   /63 10/18/2019 12:45 PM   Temp 36.9 °C (98.5 °F) 10/18/2019 12:30 PM   Pulse 68 10/18/2019 12:45 PM   Resp 18 10/18/2019 12:45 PM   SpO2 97 % 10/18/2019 12:45 PM         No case tracking events are documented in the log.      Pain/Krys Score: Krys Score: 9 (10/18/2019 12:45 PM)

## 2019-10-18 NOTE — H&P
COLONOSCOPY HISTORY AND PHYSICAL EXAM    Procedure : Colonoscopy      INDICATIONS: diverticulitis, screening exam    Family Hx of CRC: no    Last Colonoscopy:  never  Findings: n/a       Past Medical History:   Diagnosis Date    Acute diverticulitis 2019    Diabetes mellitus, type II     Hyperlipidemia     Hypertension     JOSE on CPAP      Sedation Problems: NO  Family History   Problem Relation Age of Onset    Hypertension Mother     Heart disease Father     No Known Problems Sister     Diabetes Maternal Grandmother      Fam Hx of Sedation Problems: NO  Social History     Socioeconomic History    Marital status:      Spouse name: Not on file    Number of children: 0    Years of education: Not on file    Highest education level: Not on file   Occupational History    Occupation: sales   Social Needs    Financial resource strain: Not hard at all    Food insecurity:     Worry: Never true     Inability: Never true    Transportation needs:     Medical: No     Non-medical: No   Tobacco Use    Smoking status: Former Smoker     Packs/day: 1.00     Years: 14.00     Pack years: 14.00     Types: Cigarettes     Start date: 1987     Last attempt to quit: 2001     Years since quittin.6    Smokeless tobacco: Never Used   Substance and Sexual Activity    Alcohol use: Yes     Alcohol/week: 20.0 standard drinks     Types: 20 Standard drinks or equivalent per week     Frequency: 4 or more times a week     Drinks per session: 5 or 6     Comment: sociallyy    Drug use: Never    Sexual activity: Not on file   Lifestyle    Physical activity:     Days per week: 4 days     Minutes per session: 30 min    Stress: To some extent   Relationships    Social connections:     Talks on phone: Not on file     Gets together: Not on file     Attends Alevism service: Not on file     Active member of club or organization: Not on file     Attends meetings of clubs or organizations: Not on file      "Relationship status: Not on file   Other Topics Concern    Not on file   Social History Narrative    The patient does exercise regularly (bikes 4 days/wk).      He is not satisfied with weight.    Rates diet as good (vegetarian).    He does drink at least 1/2 gallon water daily.    He drinks 2 coffee/tea/caffeine-containing soft drinks daily.    Total sleep time at night is 6 hours.    He works 60 hours per week.    He does wear seat belts.    Hobbies include biking, baseball.       Review of Systems - Negative except   Respiratory ROS: no dyspnea  Cardiovascular ROS: no exertional chest pain  Gastrointestinal ROS: NO abdominal discomfort,  NO rectal bleeding  Musculoskeletal ROS: no muscular pain  Neurological ROS: no recent stroke    Physical Exam:  BP (!) 146/101 (BP Location: Left arm, Patient Position: Sitting)   Pulse 74   Temp 98 °F (36.7 °C) (Temporal)   Resp 16   Ht 5' 6" (1.676 m)   Wt 88.5 kg (195 lb)   SpO2 99%   BMI 31.47 kg/m²   General: no distress  Head: normocephalic  Mallampati Score   Neck: supple, symmetrical, trachea midline  Lungs:  clear to auscultation bilaterally and normal respiratory effort  Heart: regular rate and rhythm and no murmur  Abdomen: soft, non-tender non-distented; bowel sounds normal; no masses,  no organomegaly  Extremities: no cyanosis or edema, or clubbing    ASA:  II    PLAN  COLONOSCOPY.    SedationPlan :MAC    The details of the procedure, the possible need for biopsy or polypectomy and the potential risks including bleeding, perforation, missed polyps were discussed in detail.    "

## 2019-10-18 NOTE — TRANSFER OF CARE
"Anesthesia Transfer of Care Note    Patient: Tracy Gabriel    Procedure(s) Performed: Procedure(s) (LRB):  COLONOSCOPY (N/A)    Patient location: PACU    Anesthesia Type: general    Transport from OR: Transported from OR on 6-10 L/min O2 by face mask with adequate spontaneous ventilation    Post pain: adequate analgesia    Post assessment: no apparent anesthetic complications and tolerated procedure well    Post vital signs: stable    Level of consciousness: awake and alert    Nausea/Vomiting: no nausea/vomiting    Complications: none    Transfer of care protocol was followed      Last vitals:   Visit Vitals  BP (!) 94/55 (BP Location: Left arm, Patient Position: Lying)   Pulse 95   Temp 36.9 °C (98.5 °F) (Temporal)   Resp 18   Ht 5' 6" (1.676 m)   Wt 88.5 kg (195 lb)   SpO2 98%   BMI 31.47 kg/m²     "

## 2019-10-25 ENCOUNTER — TELEPHONE (OUTPATIENT)
Dept: ENDOSCOPY | Facility: HOSPITAL | Age: 49
End: 2019-10-25

## 2019-10-30 ENCOUNTER — PATIENT OUTREACH (OUTPATIENT)
Dept: ADMINISTRATIVE | Facility: HOSPITAL | Age: 49
End: 2019-10-30

## 2019-10-30 DIAGNOSIS — E11.9 TYPE 2 DIABETES MELLITUS WITHOUT COMPLICATION, WITHOUT LONG-TERM CURRENT USE OF INSULIN: Primary | ICD-10-CM

## 2019-11-04 ENCOUNTER — PATIENT OUTREACH (OUTPATIENT)
Dept: ADMINISTRATIVE | Facility: HOSPITAL | Age: 49
End: 2019-11-04

## 2019-11-07 ENCOUNTER — PATIENT OUTREACH (OUTPATIENT)
Dept: OTHER | Facility: OTHER | Age: 49
End: 2019-11-07

## 2019-11-07 NOTE — PROGRESS NOTES
Digital Medicine: Health  Follow-Up    Patient apologized for lack of BG readings.  He states that he ran out of testing supplies and hasn't replaced them.  He states he will take care of ordering more supplies and resume readings.  He reports not know why his BP readings have been higher.  We reviewed technique and charging the cuff.    Patient says that weight loss has slowed, but he says he has not gained weight.    The history is provided by the patient. No  was used.     Follow Up  Follow-up reason(s): reading review      Readings are trending up       INTERVENTION(S)  reviewed monitoring technique and encouragement/support    PLAN  patient verbalizes understanding      There are no preventive care reminders to display for this patient.    Last 5 Patient Entered Readings                                      Current 30 Day Average: 128/81     Recent Readings 11/5/2019 10/28/2019 10/28/2019 10/28/2019 10/27/2019    SBP (mmHg) 139 133 145 147 136    DBP (mmHg) 87 88 89 91 84    Pulse 89 73 78 81 85        Last 6 Patient Entered Readings                                          Most Recent A1c: 5.5% on 10/10/2019  (Goal: 7%)     Recent Readings 8/4/2019 7/18/2019 7/15/2019 7/12/2019 7/11/2019    Blood Glucose (mg/dL) 101 161 123 143 129                    Diet Screening   No change to diet.  He has the following dietary restrictions: low sodium diet, diabetic and diverticulitisHe does not skip meals regularly.  He has no standard fasting periods.      Patient did not have times when they could not afford food or ran out.  He cooks for self and has meals prepared by family.    Patient does the shopping for groceries and lets family grocery shop.  He gets groceries from the grocery store.      Intervention(s): portion control and calorie tracking    Assigning the following patient goals: reduce portions, track calories, maintain low sodium diet and reduce carbs    Physical Activity Screening    No change to exercise routine.    When asked if exercising, patient responded: yes    Medication Adherence Screening   He misses doses: never        SDOH

## 2019-11-08 ENCOUNTER — TELEPHONE (OUTPATIENT)
Dept: FAMILY MEDICINE | Facility: CLINIC | Age: 49
End: 2019-11-08

## 2019-11-08 NOTE — TELEPHONE ENCOUNTER
----- Message from Junaid Koenig Jr., MD sent at 11/8/2019 11:29 AM CST -----  Please contact patient to schedule appointment with optometry as soon as possible.

## 2019-11-11 DIAGNOSIS — E11.69 HYPERLIPIDEMIA ASSOCIATED WITH TYPE 2 DIABETES MELLITUS: ICD-10-CM

## 2019-11-11 DIAGNOSIS — I15.2 HYPERTENSION ASSOCIATED WITH DIABETES: ICD-10-CM

## 2019-11-11 DIAGNOSIS — E11.59 HYPERTENSION ASSOCIATED WITH DIABETES: ICD-10-CM

## 2019-11-11 DIAGNOSIS — E78.5 HYPERLIPIDEMIA ASSOCIATED WITH TYPE 2 DIABETES MELLITUS: ICD-10-CM

## 2019-11-11 RX ORDER — VALSARTAN 320 MG/1
320 TABLET ORAL DAILY
Qty: 90 TABLET | Refills: 2 | Status: SHIPPED | OUTPATIENT
Start: 2019-11-11 | End: 2020-04-23 | Stop reason: SDUPTHER

## 2019-11-11 RX ORDER — ATORVASTATIN CALCIUM 40 MG/1
40 TABLET, FILM COATED ORAL DAILY
Qty: 90 TABLET | Refills: 2 | Status: SHIPPED | OUTPATIENT
Start: 2019-11-11 | End: 2020-04-23 | Stop reason: SDUPTHER

## 2019-11-11 NOTE — TELEPHONE ENCOUNTER
There is a copy of his recent eye exam from [facility] in his chart already.

## 2019-11-12 PROBLEM — K57.92 ACUTE DIVERTICULITIS: Status: RESOLVED | Noted: 2019-07-28 | Resolved: 2019-11-12

## 2019-11-17 RX ORDER — FENOFIBRATE 160 MG/1
160 TABLET ORAL DAILY
Qty: 30 TABLET | Refills: 0 | Status: SHIPPED | OUTPATIENT
Start: 2019-11-17 | End: 2020-01-02

## 2019-12-05 ENCOUNTER — PATIENT OUTREACH (OUTPATIENT)
Dept: OTHER | Facility: OTHER | Age: 49
End: 2019-12-05

## 2019-12-05 NOTE — LETTER
March 9, 2020     Tracy Gabriel  [address] [address]       Dear Tracy,    We have made several attempts to encourage your participation in [facility]s Digital Medicine Program. Unfortunately, we have been unsuccessful.     This is an official notice that you are no longer enrolled in the digital medicine program, and thus, we will no longer be managing your disease states. Please note this has no impact on your relationship with [facility] or your providers. Going forward, please reach out to your primary care provider with any questions or concerns regarding your health.    Please contact [phone] if you have any additional questions.    Sincerely,  The [facility] Digital Medicine Team

## 2019-12-05 NOTE — LETTER
February 7, 2020     Tracy Gabriel  [address] [facility] [address]       Dear Tracy,    Thank you for enrolling in [facility]s Digital Medicine Program. To participate, we ask that you submit information at least once weekly through your [employer] account and maintain regular contact with your Care Team. We have not received any data or heard from you in some time.     The Digital Medicine Care Team has attempted to reach you on multiple occasions to determine if you would like to continue participating in the program. While we encourage you to continue participating fully, we understand that circumstances may change.     To continue participating in the program, please contact me at [phone]. If we do not hear back, you will be un-enrolled, and your physician will be notified of your decision.    If you have submitted data and believe you are receiving this letter in error, please call the Digital Medicine Patient Support Line at [phone] for troubleshooting.      We look forward to hearing from you soon.    Sincerely,     Valerie Aggarwal  Your Personal [facility]

## 2020-01-02 RX ORDER — FENOFIBRATE 160 MG/1
160 TABLET ORAL DAILY
Qty: 30 TABLET | Refills: 0 | Status: SHIPPED | OUTPATIENT
Start: 2020-01-02 | End: 2020-04-23 | Stop reason: SDUPTHER

## 2020-01-02 NOTE — TELEPHONE ENCOUNTER
Recall notice was supposed to be entered for patient to return in mid January.    Contact patient to schedule follow-up visit with me middle of this month.

## 2020-01-11 NOTE — PROGRESS NOTES
Subjective:       Patient ID: Tracy Gabriel is a 49 y.o. male.    Chief Complaint: Follow-up (discuss test results)    Diabetes   He presents for his follow-up diabetic visit. He has type 2 diabetes mellitus. His disease course has been improving. There are no hypoglycemic associated symptoms. There are no diabetic associated symptoms. There are no hypoglycemic complications. There are no diabetic complications. Risk factors for coronary artery disease include dyslipidemia, hypertension, male sex, obesity and diabetes mellitus. Current diabetic treatment includes oral agent (monotherapy). His weight is fluctuating minimally. He is following a generally healthy diet. Meal planning includes avoidance of concentrated sweets. He has had a previous visit with a dietitian. He rarely participates in exercise. He monitors blood glucose at home 1-2 x per week. His home blood glucose trend is decreasing steadily. His breakfast blood glucose range is generally  mg/dl. An ACE inhibitor/angiotensin II receptor blocker is being taken. He does not see a podiatrist.Eye exam is current.       Patient Active Problem List   Diagnosis    Controlled type 2 diabetes mellitus without complication, without long-term current use of insulin    Hyperlipidemia associated with type 2 diabetes mellitus    JOSE (obstructive sleep apnea)    Hypertension associated with diabetes    [place] cardiac risk 10-20% in next 10 years       Current Outpatient Medications:     amLODIPine (NORVASC) 10 MG tablet, Take 1 tablet (10 mg total) by mouth once daily., Disp: 90 tablet, Rfl: 1    atorvastatin (LIPITOR) 40 MG tablet, TAKE 1 TABLET (40 MG TOTAL) BY MOUTH ONCE DAILY., Disp: 90 tablet, Rfl: 2    fenofibrate 160 MG Tab, TAKE 1 TABLET (160 MG TOTAL) BY MOUTH ONCE DAILY., Disp: 30 tablet, Rfl: 0    metFORMIN (GLUCOPHAGE-XR) 500 MG 24 hr tablet, Take 2 tablets (1,000 mg total) by mouth daily with breakfast., Disp: 60 tablet, Rfl: 1     "ONETOUCH DELICA LANCETS 30 gauge Misc, 1 lancet by Misc.(Non-Drug; Combo Route) route once daily., Disp: 25 each, Rfl: 11    ONETOUCH VERIO Strp, 1 strip by Misc.(Non-Drug; Combo Route) route once daily., Disp: 25 strip, Rfl: 11    traMADol (ULTRAM) 50 mg tablet, Take 1 tablet (50 mg total) by mouth every 6 (six) hours as needed for Pain., Disp: 20 tablet, Rfl: 0    valsartan (DIOVAN) 320 MG tablet, TAKE 1 TABLET (320 MG TOTAL) BY MOUTH ONCE DAILY., Disp: 90 tablet, Rfl: 2    varicella-zoster gE-AS01B, PF, (SHINGRIX, PF,) 50 mcg/0.5 mL injection, Inject 0.5 mLs into the muscle once. for 1 dose, Disp: 0.5 mL, Rfl: 0    The following portions of the patient's history were reviewed and updated as appropriate: allergies, past family history, past medical history, past social history and past surgical history.    Review of Systems    Other than history of present illness, noncontributory.    Objective:      BP (!) 156/96 (BP Location: Left arm, Patient Position: Sitting, BP Method: Large (Automatic))   Pulse 88   Resp 16   Ht 5' 6" (1.676 m)   Wt 96.6 kg (212 lb 14.4 oz)   BMI 34.36 kg/m²     Physical Exam    Assessment:       1. Controlled type 2 diabetes mellitus without complication, without long-term current use of insulin    2. Hypertension associated with diabetes    3. Hyperlipidemia associated with type 2 diabetes mellitus    4. [place] cardiac risk 10-20% in next 10 years    5. JOSE (obstructive sleep apnea)        Plan:       Recommended Shingrix #2.  Discussed trial of auto Pap, given home sleep study in July, 2019.  He declines, saying that with weight loss he "no longer snores."    Add HCTZ for better BP control.    Labs (see Orders)     Orders Placed This Encounter    Comprehensive metabolic panel    Lipid panel    Hemoglobin A1c    Microalbumin/creatinine urine ratio    varicella-zoster gE-AS01B, PF, (SHINGRIX, PF,) 50 mcg/0.5 mL injection     We will call the patient with results & make " "further recommendations at that time.      "This note will not be shared with the patient."  "

## 2020-01-13 ENCOUNTER — OFFICE VISIT (OUTPATIENT)
Dept: FAMILY MEDICINE | Facility: CLINIC | Age: 50
End: 2020-01-13
Attending: FAMILY MEDICINE
Payer: COMMERCIAL

## 2020-01-13 ENCOUNTER — LAB VISIT (OUTPATIENT)
Dept: LAB | Facility: HOSPITAL | Age: 50
End: 2020-01-13
Attending: FAMILY MEDICINE
Payer: COMMERCIAL

## 2020-01-13 VITALS
SYSTOLIC BLOOD PRESSURE: 156 MMHG | RESPIRATION RATE: 16 BRPM | DIASTOLIC BLOOD PRESSURE: 96 MMHG | WEIGHT: 212.88 LBS | BODY MASS INDEX: 34.21 KG/M2 | HEART RATE: 88 BPM | HEIGHT: 66 IN

## 2020-01-13 DIAGNOSIS — E11.69 HYPERLIPIDEMIA ASSOCIATED WITH TYPE 2 DIABETES MELLITUS: Chronic | ICD-10-CM

## 2020-01-13 DIAGNOSIS — E11.9 CONTROLLED TYPE 2 DIABETES MELLITUS WITHOUT COMPLICATION, WITHOUT LONG-TERM CURRENT USE OF INSULIN: Primary | Chronic | ICD-10-CM

## 2020-01-13 DIAGNOSIS — E78.5 HYPERLIPIDEMIA ASSOCIATED WITH TYPE 2 DIABETES MELLITUS: Chronic | ICD-10-CM

## 2020-01-13 DIAGNOSIS — Z91.89 FRAMINGHAM CARDIAC RISK 10-20% IN NEXT 10 YEARS: ICD-10-CM

## 2020-01-13 DIAGNOSIS — E11.59 HYPERTENSION ASSOCIATED WITH DIABETES: Chronic | ICD-10-CM

## 2020-01-13 DIAGNOSIS — I15.2 HYPERTENSION ASSOCIATED WITH DIABETES: Chronic | ICD-10-CM

## 2020-01-13 DIAGNOSIS — G47.33 OSA (OBSTRUCTIVE SLEEP APNEA): Chronic | ICD-10-CM

## 2020-01-13 DIAGNOSIS — E11.9 CONTROLLED TYPE 2 DIABETES MELLITUS WITHOUT COMPLICATION, WITHOUT LONG-TERM CURRENT USE OF INSULIN: Chronic | ICD-10-CM

## 2020-01-13 LAB
ALBUMIN SERPL BCP-MCNC: 4.1 G/DL (ref 3.5–5.2)
ALP SERPL-CCNC: 50 U/L (ref 55–135)
ALT SERPL W/O P-5'-P-CCNC: 32 U/L (ref 10–44)
ANION GAP SERPL CALC-SCNC: 12 MMOL/L (ref 8–16)
AST SERPL-CCNC: 24 U/L (ref 10–40)
BILIRUB SERPL-MCNC: 0.5 MG/DL (ref 0.1–1)
BUN SERPL-MCNC: 13 MG/DL (ref 6–20)
CALCIUM SERPL-MCNC: 9.3 MG/DL (ref 8.7–10.5)
CHLORIDE SERPL-SCNC: 107 MMOL/L (ref 95–110)
CHOLEST SERPL-MCNC: 269 MG/DL (ref 120–199)
CHOLEST/HDLC SERPL: 4.9 {RATIO} (ref 2–5)
CO2 SERPL-SCNC: 21 MMOL/L (ref 23–29)
CREAT SERPL-MCNC: 0.9 MG/DL (ref 0.5–1.4)
EST. GFR  (AFRICAN AMERICAN): >60 ML/MIN/1.73 M^2
EST. GFR  (NON AFRICAN AMERICAN): >60 ML/MIN/1.73 M^2
ESTIMATED AVG GLUCOSE: 123 MG/DL (ref 68–131)
GLUCOSE SERPL-MCNC: 114 MG/DL (ref 70–110)
HBA1C MFR BLD HPLC: 5.9 % (ref 4–5.6)
HDLC SERPL-MCNC: 55 MG/DL (ref 40–75)
HDLC SERPL: 20.4 % (ref 20–50)
LDLC SERPL CALC-MCNC: ABNORMAL MG/DL (ref 63–159)
NONHDLC SERPL-MCNC: 214 MG/DL
POTASSIUM SERPL-SCNC: 4.3 MMOL/L (ref 3.5–5.1)
PROT SERPL-MCNC: 7.6 G/DL (ref 6–8.4)
SODIUM SERPL-SCNC: 140 MMOL/L (ref 136–145)
TRIGL SERPL-MCNC: 630 MG/DL (ref 30–150)

## 2020-01-13 PROCEDURE — 80053 COMPREHEN METABOLIC PANEL: CPT

## 2020-01-13 PROCEDURE — 36415 COLL VENOUS BLD VENIPUNCTURE: CPT | Mod: PO

## 2020-01-13 PROCEDURE — 3044F PR MOST RECENT HEMOGLOBIN A1C LEVEL <7.0%: ICD-10-PCS | Mod: CPTII,S$GLB,, | Performed by: FAMILY MEDICINE

## 2020-01-13 PROCEDURE — 3077F PR MOST RECENT SYSTOLIC BLOOD PRESSURE >= 140 MM HG: ICD-10-PCS | Mod: CPTII,S$GLB,, | Performed by: FAMILY MEDICINE

## 2020-01-13 PROCEDURE — 99214 OFFICE O/P EST MOD 30 MIN: CPT | Mod: S$GLB,,, | Performed by: FAMILY MEDICINE

## 2020-01-13 PROCEDURE — 3044F HG A1C LEVEL LT 7.0%: CPT | Mod: CPTII,S$GLB,, | Performed by: FAMILY MEDICINE

## 2020-01-13 PROCEDURE — 99999 PR PBB SHADOW E&M-EST. PATIENT-LVL III: CPT | Mod: PBBFAC,,, | Performed by: FAMILY MEDICINE

## 2020-01-13 PROCEDURE — 3008F PR BODY MASS INDEX (BMI) DOCUMENTED: ICD-10-PCS | Mod: CPTII,S$GLB,, | Performed by: FAMILY MEDICINE

## 2020-01-13 PROCEDURE — 99214 PR OFFICE/OUTPT VISIT, EST, LEVL IV, 30-39 MIN: ICD-10-PCS | Mod: S$GLB,,, | Performed by: FAMILY MEDICINE

## 2020-01-13 PROCEDURE — 3080F PR MOST RECENT DIASTOLIC BLOOD PRESSURE >= 90 MM HG: ICD-10-PCS | Mod: CPTII,S$GLB,, | Performed by: FAMILY MEDICINE

## 2020-01-13 PROCEDURE — 80061 LIPID PANEL: CPT

## 2020-01-13 PROCEDURE — 3008F BODY MASS INDEX DOCD: CPT | Mod: CPTII,S$GLB,, | Performed by: FAMILY MEDICINE

## 2020-01-13 PROCEDURE — 99999 PR PBB SHADOW E&M-EST. PATIENT-LVL III: ICD-10-PCS | Mod: PBBFAC,,, | Performed by: FAMILY MEDICINE

## 2020-01-13 PROCEDURE — 83036 HEMOGLOBIN GLYCOSYLATED A1C: CPT

## 2020-01-13 PROCEDURE — 3077F SYST BP >= 140 MM HG: CPT | Mod: CPTII,S$GLB,, | Performed by: FAMILY MEDICINE

## 2020-01-13 PROCEDURE — 3080F DIAST BP >= 90 MM HG: CPT | Mod: CPTII,S$GLB,, | Performed by: FAMILY MEDICINE

## 2020-01-13 RX ORDER — HYDROCHLOROTHIAZIDE 12.5 MG/1
12.5 TABLET ORAL DAILY
Qty: 30 TABLET | Refills: 0 | Status: SHIPPED | OUTPATIENT
Start: 2020-01-13 | End: 2020-02-27

## 2020-01-13 NOTE — Clinical Note
Natasha:Mr. Gabriel states he never heard fro anyone concenring his home sleep study.Junaid Koenig MD

## 2020-01-13 NOTE — PATIENT INSTRUCTIONS
Jose,     We are always striving for excellence. Should you receive a patient experience survey electronically or by mail, we would appreciate if you would take a few moments to give us your feedback. These surveys let us know our strengths as well as areas of opportunity for improvement to better serve you.    Thank you for your time,  Leann Elena LPN    Your test results will be communicated to you via : [facility], Telephone or Letter.   If you have not received your test results in one week, please contact the clinic at [phone].

## 2020-01-14 ENCOUNTER — PATIENT MESSAGE (OUTPATIENT)
Dept: FAMILY MEDICINE | Facility: CLINIC | Age: 50
End: 2020-01-14

## 2020-01-14 DIAGNOSIS — E11.59 HYPERTENSION ASSOCIATED WITH DIABETES: ICD-10-CM

## 2020-01-14 DIAGNOSIS — G47.33 OBSTRUCTIVE SLEEP APNEA: Primary | ICD-10-CM

## 2020-01-14 DIAGNOSIS — I15.2 HYPERTENSION ASSOCIATED WITH DIABETES: ICD-10-CM

## 2020-01-14 RX ORDER — AMLODIPINE BESYLATE 10 MG/1
10 TABLET ORAL DAILY
Qty: 90 TABLET | Refills: 1 | Status: SHIPPED | OUTPATIENT
Start: 2020-01-14 | End: 2020-10-05 | Stop reason: SDUPTHER

## 2020-02-27 RX ORDER — HYDROCHLOROTHIAZIDE 12.5 MG/1
12.5 TABLET ORAL DAILY
Qty: 30 TABLET | Refills: 0 | Status: SHIPPED | OUTPATIENT
Start: 2020-02-27 | End: 2020-03-30

## 2020-02-27 NOTE — TELEPHONE ENCOUNTER
Spoke with pt and inform him that dr approved his HCTZ but he needs to come in for b.p check ASAP.Pt agrees and verbalize and is schedule for b/p on 03/02 for 8am.

## 2020-02-27 NOTE — TELEPHONE ENCOUNTER
HCTZ was added after his last visit to the office in January.  He has yet to return for blood pressure check.  Please contact patient and schedule him for nurse visit as soon as possible.

## 2020-03-30 RX ORDER — HYDROCHLOROTHIAZIDE 12.5 MG/1
TABLET ORAL
Qty: 30 TABLET | Refills: 0 | Status: SHIPPED | OUTPATIENT
Start: 2020-03-30 | End: 2020-06-04 | Stop reason: SDUPTHER

## 2020-04-23 DIAGNOSIS — E78.5 HYPERLIPIDEMIA ASSOCIATED WITH TYPE 2 DIABETES MELLITUS: ICD-10-CM

## 2020-04-23 DIAGNOSIS — E11.9 TYPE 2 DIABETES MELLITUS WITHOUT COMPLICATION, WITHOUT LONG-TERM CURRENT USE OF INSULIN: ICD-10-CM

## 2020-04-23 DIAGNOSIS — E11.69 HYPERLIPIDEMIA ASSOCIATED WITH TYPE 2 DIABETES MELLITUS: ICD-10-CM

## 2020-04-23 DIAGNOSIS — E11.59 HYPERTENSION ASSOCIATED WITH DIABETES: ICD-10-CM

## 2020-04-23 DIAGNOSIS — I15.2 HYPERTENSION ASSOCIATED WITH DIABETES: ICD-10-CM

## 2020-04-23 RX ORDER — VALSARTAN 320 MG/1
320 TABLET ORAL DAILY
Qty: 90 TABLET | Refills: 0 | Status: SHIPPED | OUTPATIENT
Start: 2020-04-23 | End: 2020-08-17 | Stop reason: SDUPTHER

## 2020-04-23 RX ORDER — AMLODIPINE BESYLATE 10 MG/1
10 TABLET ORAL DAILY
Qty: 90 TABLET | Refills: 1 | Status: CANCELLED | OUTPATIENT
Start: 2020-04-23 | End: 2021-04-23

## 2020-04-23 RX ORDER — METFORMIN HYDROCHLORIDE 500 MG/1
1000 TABLET, EXTENDED RELEASE ORAL
Qty: 180 TABLET | Refills: 0 | Status: SHIPPED | OUTPATIENT
Start: 2020-04-23 | End: 2020-10-06

## 2020-04-23 RX ORDER — FENOFIBRATE 160 MG/1
160 TABLET ORAL DAILY
Qty: 90 TABLET | Refills: 0 | Status: SHIPPED | OUTPATIENT
Start: 2020-04-23 | End: 2020-09-14 | Stop reason: SDUPTHER

## 2020-04-23 RX ORDER — ATORVASTATIN CALCIUM 40 MG/1
40 TABLET, FILM COATED ORAL DAILY
Qty: 90 TABLET | Refills: 0 | Status: SHIPPED | OUTPATIENT
Start: 2020-04-23 | End: 2020-07-19

## 2020-04-23 NOTE — TELEPHONE ENCOUNTER
"Medications refilled.  Please contact patient by phone, do not send patient portal message.  He never responded to my message of 01/14/2020.  "However, cholesterol and triglyceride remain will above goal therapy.   Are you taking atorvastatin and fenofibrate daily as prescribed?"     "

## 2020-04-23 NOTE — TELEPHONE ENCOUNTER
Discussed lab results with the patient. Patient states he has not been taking his atorvastatin and fenofibrate as diligent as he should be.

## 2020-04-24 NOTE — PROGRESS NOTES
Digital Medicine: Health  Follow-Up    Called patient for health  follow up.  He states that he is doing well and denies any symptoms of hyper or hypotension and denies symptoms of hyper or hypoglycemia.  He states that he needs more test strips.  I sent number to DME via [employer] to patient.    The history is provided by the patient. No  was used.     Follow Up  Follow-up reason(s): reading review      Readings are missing.       INTERVENTION(S)  encouragement/support    PLAN  continue monitoring      There are no preventive care reminders to display for this patient.    Last 5 Patient Entered Readings                                      Current 30 Day Average: 136/85     Recent Readings 4/24/2020 4/24/2020 4/24/2020 4/24/2020 4/23/2020    SBP (mmHg) 134 147 134 123 128    DBP (mmHg) 78 92 82 86 82    Pulse 75 78 78 81 78        Last 6 Patient Entered Readings                                          Most Recent A1c: 5.9% on 1/13/2020  (Goal: 7%)     Recent Readings 8/4/2019 7/18/2019 7/15/2019 7/12/2019 7/11/2019    Blood Glucose (mg/dL) 101 161 123 143 129                    Diet Screening   No change to diet.  Patient reports eating or drinking the following: fruit, water, fresh vegetables and lean proteinsHe has the following dietary restrictions: low sodium diet and diabeticHe has meals prepared by family.    Patient lets family grocery shop.  He gets groceries from the grocery store.      Assigning the following patient goals: meal plan    Physical Activity Screening   No change to exercise routine.    When asked if exercising, patient responded: yes    Patient participates in the following activities: yard/housework    Patient states that he remains active at work and doing work around the house.    Medication Adherence Screening   He did not miss a dose this month.  Patient knows purpose of medications.      Patient identified the following reasons for non-compliance:  None      SDOH

## 2020-04-25 DIAGNOSIS — E11.9 TYPE 2 DIABETES MELLITUS: ICD-10-CM

## 2020-04-28 ENCOUNTER — PATIENT OUTREACH (OUTPATIENT)
Dept: OTHER | Facility: OTHER | Age: 50
End: 2020-04-28

## 2020-04-28 DIAGNOSIS — E11.9 TYPE 2 DIABETES MELLITUS WITHOUT COMPLICATION, WITHOUT LONG-TERM CURRENT USE OF INSULIN: Primary | ICD-10-CM

## 2020-04-28 NOTE — PROGRESS NOTES
Patient called back to review medications.   We found he does not have, and has not been taking, metformin.     Discussed with patient - last A1c is controlled at 5.9%, no recent SMBG to verify current control. Patient is waiting on testing strips from DME, which is delayed by 2-3 weeks.     Asked patient to contact his pharmacy to verify the last time he filled the metformin in an attempt to gauge when he may have stopped taking it. Pharmacy confirmed he has not filled metformin since Septemeber 2019.     At this point, will hold metformin and await SMBG to verify the need. Will re-introduce at appropriate dosing once readings are available.

## 2020-04-28 NOTE — PROGRESS NOTES
Digital Medicine: Clinician Follow-Up    Called . Tracy Gabriel for hypertension and diabetes digital medicine routine follow-up.   No blood pressure readings were supplied from mid December 2019 until April 2020. In this time frame of digital medicine inactivity, HCTZ 12.5 mg daily was started by PCP.  No blood glucose readings have been supplied since  July 2019.     Patient reports adherence to medication regimen with no complaints, although questions if we can review his medications. He thinks he is missing one, believes it to be a hyperlipidemia treatment. He is not home at this time, and requests to complete this review this afternoon. He will call me back.     The history is provided by the patient.     Follow Up  Follow-up reason(s): reading review and routine education      Readings are missing.   DME supplies.Patient denies s/s of hypotension (lightheadedness, dizziness, nausea, fatigue) associated with low readings. Instructed patient to inform me if this occurs, patient confirms understanding.    Patient denies s/s of hypertension (SOB, CP, severe headaches, changes in vision) associated with high readings. Instructed patient to go to the ED if BP >180/110 and accompanied by hypertensive s/s, patient confirms understanding.    Patient denies s/s or episodes of hypoglycemia (weakness, dizziness, hunger, shakiness, nausea, headache, heart palpitations, sweating, fatigue, anxiety, etc.).    Patient denies s/s of hyperglycemia (headache, increased thirst, increased urination, fatigue, blurred vision).    Patient's current 30-day average is just above goal of <130/80 mmHg based on ACC/AHA HTN guidelines. Current average = 130/81.     Diabetes is controlled based on patient's last A1C. SMBG are not being supplied. Patient apologizes, states he wishes to remain in the program and that he recently ordered more supplies to resume digital testing.     Health maintenance is not up to  date.      INTERVENTION(S)  reviewed appropriate dose schedule and encouragement/support    PLAN  patient verbalizes understanding, additional monitoring needed, Health  follow up and continue monitoring    Continue current regimen.  Patient to call back to complete medication review - he believes he is missing a medication. Appears he needs a refill of HCTZ - discuss with call back.   Recommend a BMP post HCTZ initation - once COVID pandemic settles. Standing order placed.     Patient's health , Valerie Aggarwal, will follow-up as scheduled.    I will continue to monitor regularly and will follow-up in 3-4 months.     Patient has my contact information and knows to call with any concerns or clinical changes.      There are no preventive care reminders to display for this patient.    Last 5 Patient Entered Readings                                      Current 30 Day Average: 130/81     Recent Readings 4/28/2020 4/28/2020 4/28/2020 4/28/2020 4/27/2020    SBP (mmHg) 135 128 137 137 107    DBP (mmHg) 64 79 76 83 59    Pulse 91 87 66 69 85        Last 6 Patient Entered Readings                                          Most Recent A1c: 5.9% on 1/13/2020  (Goal: 7%)     Recent Readings 8/4/2019 7/18/2019 7/15/2019 7/12/2019 7/11/2019    Blood Glucose (mg/dL) 101 161 123 143 129           Hypertension Medications             amLODIPine (NORVASC) 10 MG tablet TAKE 1 TABLET (10 MG TOTAL) BY MOUTH ONCE DAILY.    hydroCHLOROthiazide (HYDRODIURIL) 12.5 MG Tab TAKE 1 TABLET BY MOUTH ONCE A DAY    valsartan (DIOVAN) 320 MG tablet Take 1 tablet (320 mg total) by mouth once daily.        Diabetes Medications             metFORMIN (GLUCOPHAGE-XR) 500 MG XR 24hr tablet Take 2 tablets (1,000 mg total) by mouth daily with breakfast.                 Sleep Apnea Screening  Patient previously diagnosed with JOSE     Medication Affordability Screening  Patient is currently not having problems affording medications

## 2020-06-04 RX ORDER — HYDROCHLOROTHIAZIDE 12.5 MG/1
12.5 TABLET ORAL DAILY
Qty: 90 TABLET | Refills: 2 | Status: SHIPPED | OUTPATIENT
Start: 2020-06-04 | End: 2020-10-06 | Stop reason: DRUGHIGH

## 2020-06-05 ENCOUNTER — OFFICE VISIT (OUTPATIENT)
Dept: SLEEP MEDICINE | Facility: CLINIC | Age: 50
End: 2020-06-05

## 2020-06-05 DIAGNOSIS — G47.33 OSA (OBSTRUCTIVE SLEEP APNEA): Primary | Chronic | ICD-10-CM

## 2020-06-05 DIAGNOSIS — E11.59 HYPERTENSION ASSOCIATED WITH DIABETES: Chronic | ICD-10-CM

## 2020-06-05 DIAGNOSIS — I15.2 HYPERTENSION ASSOCIATED WITH DIABETES: Chronic | ICD-10-CM

## 2020-06-05 DIAGNOSIS — E11.9 CONTROLLED TYPE 2 DIABETES MELLITUS WITHOUT COMPLICATION, WITHOUT LONG-TERM CURRENT USE OF INSULIN: Chronic | ICD-10-CM

## 2020-06-05 PROCEDURE — 99214 OFFICE O/P EST MOD 30 MIN: CPT | Mod: 95,CR,, | Performed by: NURSE PRACTITIONER

## 2020-06-05 PROCEDURE — 99214 PR OFFICE/OUTPT VISIT, EST, LEVL IV, 30-39 MIN: ICD-10-PCS | Mod: 95,CR,, | Performed by: NURSE PRACTITIONER

## 2020-06-05 NOTE — PROGRESS NOTES
The patient location is home  The chief complaint leading to consultation is JOSE mgt    Visit type: TELE AUDIOVISUAL:83011    Each patient to whom he or she provides medical services by telemedicine is:  (1) informed of the relationship between the physician and patient and the respective role of any other health care provider with respect to management of the patient; and (2) notified that he or she may decline to receive medical services by telemedicine and may withdraw from such care at any time.    Notes:  Tracy Gabriel a 50 y.o. male returns for mgt of JOSE. He had HST revealing mild-mod JOSE 7/2019. He got setup with apap 6-20cm. 4/2/2020. Doing well. Acclimated quickly to it.. Using XL  Wisp mask + chin strap. Denies nose drying so not using water system. Denies pressure intolerance. Wife likes it. Denies snoring . Sleep is consolidated now. Less daytime sleepiness. ~10# wgt loss. In digital bp program, saw pcp 1/2020    Encore:  Date Range: 5/6/2020 - 6/4/2020   0% leak, 90% tile 10.9cm  Days with Device Usage:   26 days   Percentage of Days >=4 Hours:   80.0%   Average Usage (Days Used):   5 hrs. 21 mins. 21 secs.   Average Usage (All Days):   4 hrs. 38 mins. 30 secs.   Average AHI:   0.8    SOCIAL HISTORY: . [employer]    REVIEW OF SYSTEMS: 6/5/20  Sleep related symptoms as per HPI  Difficulty breathing through the nose?  No   Sore throat or dry mouth in the morning? Yes   Irregular or very fast heart beat?  No   Shortness of breath?  No   Acid reflux? Sometimes   Body aches and pains?  No   Morning headaches? No   Dizziness? No   Mood changes?  Yes   Do you exercise?  Sometimes   Do you feel like moving your legs a lot?  Yes     PHYSICAL EXAM:   There were no vitals taken for this visit.  GENERAL: Obese body habitus, well groomed   NEURO/PSYCH: Oriented to time, place and person. Normal attention span and concentration. Affect is full. Mood is normal.     HgBA1c 5.9 (1/2020)  HST AHI  10(RDI 29)/low sat 86%    ASSESSMENT:     JOSE, mild (mod RDI ). Excellent adherence with pap, benefiting from therapy. AHI<5  He has medical comorbidities of obesity, hypertension, DM2 on metformin    PLAN:   1. Continue apap 6-20cm. [facility] DME supplies. RT C annually, consider alternative mask when eligible  2. Discussed effectiveness of therapy and potential ramifications of untreated JOSE, including stroke, heart disease, HTN.    3/ See PCP as advised re: DM/HTN mgt/continue meds.

## 2020-06-10 ENCOUNTER — PATIENT OUTREACH (OUTPATIENT)
Dept: OTHER | Facility: OTHER | Age: 50
End: 2020-06-10

## 2020-06-18 ENCOUNTER — TELEPHONE (OUTPATIENT)
Dept: FAMILY MEDICINE | Facility: CLINIC | Age: 50
End: 2020-06-18

## 2020-06-18 ENCOUNTER — PATIENT OUTREACH (OUTPATIENT)
Dept: OTHER | Facility: OTHER | Age: 50
End: 2020-06-18

## 2020-06-18 DIAGNOSIS — E11.9 TYPE 2 DIABETES MELLITUS WITHOUT COMPLICATION, WITHOUT LONG-TERM CURRENT USE OF INSULIN: Primary | ICD-10-CM

## 2020-06-18 NOTE — TELEPHONE ENCOUNTER
Thanks for the update.  If I remember, he had similar problems on the immediate release metformin (which is why we changed to the extended release).  Hopefully, he will avoid large carbohydrate loads...

## 2020-06-18 NOTE — PROGRESS NOTES
"Digital Medicine: Clinician Follow-Up    NO glucose readings.   HME never returned call, per patient. Contacted E department to contact patient for assistance. No glucose readings have been received in 10 months. He does continue to check with an older meter, states his fasting BG ranges from 100-120 most days. A1c is at goal. Due for repeat next month.     BP upward trend. Lightheaded at times. Outside during the day for work at times. Feels like he is trying to hydrate, but not sure he is keeping up well with it.     Tries to take medications at night, but forgets and takes them in the morning. Discussed tips to improve compliance.    Heavy carbohydrate load - diarrhea near after. Feels like glucose is dropping, but has not tested this. Advised him to discuss with PCP, as likely not endo related.     Has old meter and SMBG is 100-120 mg/dL.     New sleep mask - using it nightly. Not "enjoying it" and takes off in the middle of the night. Trying to do better.     The history is provided by the patient.   Follow Up  Follow-up reason(s): reading review      Readings are missing.   DME supplies.      INTERVENTION(S)  reviewed appropriate dose schedule, reviewed monitoring technique and encouragement/support    PLAN  patient verbalizes understanding, additional monitoring needed, Health  follow up and continue monitoring    Contacted HME to contact patient for supplies.   Standing orders are in place for A1c and BMP, patient is aware. Declines scheduling of an appointment today. Knows to call if he changes his mind. Encouraged him to obtain soon.    Continues to not take metformin.   Message sent to PCP regarding diarrhea post carbohydrate intake.     Continue current regimen. Will plan follow up in 4-6 months as patient is controlling both HTN and Diabetes.           There are no preventive care reminders to display for this patient.    Last 5 Patient Entered Readings                                      " Current 30 Day Average: 128/78     Recent Readings 6/18/2020 6/18/2020 6/18/2020 6/17/2020 6/17/2020    SBP (mmHg) 138 137 141 130 121    DBP (mmHg) 80 81 99 77 76    Pulse 86 84 81 85 92        Last 6 Patient Entered Readings                                          Most Recent A1c: 5.9% on 1/13/2020  (Goal: 7%)     Recent Readings 8/4/2019 7/18/2019 7/15/2019 7/12/2019 7/11/2019    Blood Glucose (mg/dL) 101 161 123 143 129           Hypertension Medications             amLODIPine (NORVASC) 10 MG tablet TAKE 1 TABLET (10 MG TOTAL) BY MOUTH ONCE DAILY.    hydroCHLOROthiazide (HYDRODIURIL) 12.5 MG Tab Take 1 tablet (12.5 mg total) by mouth once daily.    valsartan (DIOVAN) 320 MG tablet Take 1 tablet (320 mg total) by mouth once daily.        Diabetes Medications             metFORMIN (GLUCOPHAGE-XR) 500 MG XR 24hr tablet Take 2 tablets (1,000 mg total) by mouth daily with breakfast.                   Sleep Apnea Screening  Patient previously diagnosed with JOSE     Medication Affordability Screening  Patient is currently not having problems affording medications

## 2020-06-18 NOTE — TELEPHONE ENCOUNTER
----- Message from Wendi Kennedy PharmD sent at 6/18/2020  1:21 PM CDT -----  Regarding: making you aware  Hi, Dr. Koenig.   Spoke to Mr. Garcia today. He is doing well overall. He did complain of diarrhea almost immediately post large carbohydrate consumption. He has refrained from large carb loads and minimizes his carb intake, but he did ask that I make you aware of this complaint.     I do not feel it is related to his diabetes or hypertension, so I have informed him this is out of my scope. I advised him to monitor his BG after an event, if able, as he mentioned he feels like his sugar drops with the episode    Please let me know if you have any questions.   Wendi

## 2020-07-08 NOTE — PROGRESS NOTES
Digital Medicine: Health  Follow-Up    Called patient for health  follow up.  He reports hypotensive symptoms such a lightheadedness, dizziness, and fatigue.  Routed chart to patient's clinician for follow up.    The history is provided by the patient. No  was used.   Follow Up  Follow-up reason(s): reading review      Readings are trending down due to medication adherence.        INTERVENTION(S)  recommended diet modifications    PLAN  Clinician follow-up and continue monitoring          Topic    Hemoglobin A1C     Eye Exam        Last 5 Patient Entered Readings                                      Current 30 Day Average: 115/71     Recent Readings 7/8/2020 7/8/2020 7/7/2020 7/7/2020 7/7/2020    SBP (mmHg) 104 99 104 111 105    DBP (mmHg) 60 54 62 62 67    Pulse 78 82 69 73 69        Last 6 Patient Entered Readings                                          Most Recent A1c: 5.9% on 1/13/2020  (Goal: 7%)     Recent Readings 7/8/2020 7/7/2020 7/7/2020 7/7/2020 7/6/2020    Blood Glucose (mg/dL) 166 112 137 125 111                    Diet Screening   Patient reports eating or drinking the following: fruit, water, fresh vegetables and lean proteins, whole grainsHe has the following dietary restrictions: low sodium diet and diabeticHe does not skip meals regularly.  He has no standard fasting periods.      Patient did not have times when they could not afford food or ran out.  He has meals prepared by family.    Patient lets family grocery shop.  He gets groceries from the grocery store.      Eating style: confused about food/nutrition    Barriers to a Healthy Diet: lack of knowledge    Patient states he has been more mindful of water intake, but is confused about how much water he should be drinking.  I advised patient with as active as he is and due to the fact is spend much of his day outside in the heat, his goal should be a gallon of water per day.    Intervention(s): increasing water  intake    Physical Activity Screening   No change to exercise routine.    When asked if exercising, patient responded: yes    Patient states he remains physically active at work.    Medication Adherence Screening   He did not miss a dose this month.  Patient knows purpose of medications.      Patient identified the following reasons for non-compliance: None      SDOH

## 2020-07-09 ENCOUNTER — PATIENT OUTREACH (OUTPATIENT)
Dept: OTHER | Facility: OTHER | Age: 50
End: 2020-07-09

## 2020-07-09 NOTE — PROGRESS NOTES
Patient complained of hypotensive symptoms to HC. Sent patient a message to hold HCTZ, he has not read it at this time.   Called to follow up, EHSAN BLAKELY requesting he call back.     BP is on a significant downward trend since our last conversation - no medication changes have been made.   Consider if patient was not compliant, and started taking medications as prescribed to achieve this downward trend???    Will advise to hold HCTZ as he is not hydrating enough per patient report at last outreach. Consider decreasing amlodipine.     Last 5 Patient Entered Readings                                      Current 30 Day Average: 114/70     Recent Readings 7/9/2020 7/9/2020 7/8/2020 7/8/2020 7/8/2020    SBP (mmHg) 108 102 95 100 94    DBP (mmHg) 66 63 55 56 54    Pulse 72 74 76 79 82        Hypertension Medications             amLODIPine (NORVASC) 10 MG tablet TAKE 1 TABLET (10 MG TOTAL) BY MOUTH ONCE DAILY.    hydroCHLOROthiazide (HYDRODIURIL) 12.5 MG Tab Take 1 tablet (12.5 mg total) by mouth once daily.    valsartan (DIOVAN) 320 MG tablet Take 1 tablet (320 mg total) by mouth once daily.

## 2020-07-09 NOTE — PROGRESS NOTES
Digital Medicine: Clinician Follow-Up    Spoke to Mr. Garcia regarding his blood pressure downward trend and hypotensive symptoms.   He is feeling weak, lightheaded, and dizzy at times.   He endorses adequate hydration, states this was the first thing he considered so he made sure to be hydrated daily.   He reports a change in diet about 5 weeks ago and he quit drinking. He states since these lifestyle changes he has noticed his blood pressure responding well.     Focused call on hypertension today due to task and hypotensive episodes.     Patient denies hypoglycemic episodes and symptoms at this time. A1c is well controlled. He is repeating it soon.     The history is provided by the patient.   Follow Up  Follow-up reason(s): reading review, medication change, routine education and responding to task      Medication Change: stop therapy        INTERVENTION(S)  reviewed appropriate dose schedule, recommended med change and encouragement/support    PLAN  patient verbalizes understanding, patient amenable to changes, Health  follow up and continue monitoring    Hold HCTZ.   Patient was advised plan to half amlodipine tablet if he continues with hypotensive symptoms in 1 week. He is to notify digital medicine if he makes this change and if his symptoms do not improve.     Encouraged he continue with lifestyle efforts to control his outcomes.     Will continue to monitor. Will discontinue HCTZ with next outreach pending outcome.           Topic    Hemoglobin A1C     Eye Exam        Last 5 Patient Entered Readings                                      Current 30 Day Average: 114/70     Recent Readings 7/9/2020 7/9/2020 7/8/2020 7/8/2020 7/8/2020    SBP (mmHg) 108 102 95 100 94    DBP (mmHg) 66 63 55 56 54    Pulse 72 74 76 79 82        Last 6 Patient Entered Readings                                          Most Recent A1c: 5.9% on 1/13/2020  (Goal: 7%)     Recent Readings 7/8/2020 7/7/2020 7/7/2020 7/7/2020  7/6/2020    Blood Glucose (mg/dL) 166 112 137 125 111           Hypertension Medications             amLODIPine (NORVASC) 10 MG tablet TAKE 1 TABLET (10 MG TOTAL) BY MOUTH ONCE DAILY.    hydroCHLOROthiazide (HYDRODIURIL) 12.5 MG Tab Take 1 tablet (12.5 mg total) by mouth once daily.    valsartan (DIOVAN) 320 MG tablet Take 1 tablet (320 mg total) by mouth once daily.        Diabetes Medications             metFORMIN (GLUCOPHAGE-XR) 500 MG XR 24hr tablet Take 2 tablets (1,000 mg total) by mouth daily with breakfast.               Screenings

## 2020-07-14 ENCOUNTER — PATIENT OUTREACH (OUTPATIENT)
Dept: OTHER | Facility: OTHER | Age: 50
End: 2020-07-14

## 2020-07-14 NOTE — PROGRESS NOTES
"Digital Medicine: Clinician Follow-Up    Follow up on patient's recent hypotensive episodes. Patient states he is "all better with no complaints."   He denies re-current hypotensive episodes or symptoms since stopping HCTZ. He has continued amlodipine.     The history is provided by the patient.   The history is provided by the patient.   Follow-up reason(s): medication change follow-up.     Patient did make medication change.    Is patient tolerating med change?: yes          ASSESSMENT(S)  Patients BP average is 110/64 mmHg, which is at goal. Patient's BP goal is less than or equal to 130/80 per 2017 ACC/AHA Hypertension Guidelines.  Patient's most recent A1C result is Lab Results    Component                Value               Date                     HGBA1C                   5.9 (H)             01/13/2020           and is at goal. Patient's A1C goal is less than or equal to 7.        PLAN  Continue current therapy:    Patient verbalizes understanding. Patient did not express questions or concerns and patient has contact information if needed.      Will continue to monitor - patient to contact me if symptoms re-occur. Would recommend decreasing amlodipine at that time.         Topic    Hemoglobin A1C     Eye Exam        Last 5 Patient Entered Readings                                      Current 30 Day Average: 111/66     Recent Readings 7/14/2020 7/13/2020 7/13/2020 7/13/2020 7/12/2020    SBP (mmHg) 116 113 108 104 96    DBP (mmHg) 67 63 61 53 49    Pulse 74 61 64 68 77        Last 6 Patient Entered Readings                                          Most Recent A1c: 5.9% on 1/13/2020  (Goal: 7%)     Recent Readings 7/13/2020 7/13/2020 7/12/2020 7/12/2020 7/12/2020    Blood Glucose (mg/dL) 147 135 122 100 121           Hypertension Medications             amLODIPine (NORVASC) 10 MG tablet TAKE 1 TABLET (10 MG TOTAL) BY MOUTH ONCE DAILY.    hydroCHLOROthiazide (HYDRODIURIL) 12.5 MG Tab Take 1 tablet (12.5 mg " total) by mouth once daily.    valsartan (DIOVAN) 320 MG tablet Take 1 tablet (320 mg total) by mouth once daily.        Diabetes Medications             metFORMIN (GLUCOPHAGE-XR) 500 MG XR 24hr tablet Take 2 tablets (1,000 mg total) by mouth daily with breakfast.                 Depression Screening  Did not address depression screening.    Sleep Apnea Screening    Did not address sleep apnea screening.     Medication Affordability Screening  Did not address medication affordability screening.

## 2020-08-17 DIAGNOSIS — E11.59 HYPERTENSION ASSOCIATED WITH DIABETES: ICD-10-CM

## 2020-08-17 DIAGNOSIS — I15.2 HYPERTENSION ASSOCIATED WITH DIABETES: ICD-10-CM

## 2020-08-18 RX ORDER — VALSARTAN 320 MG/1
320 TABLET ORAL DAILY
Qty: 90 TABLET | Refills: 1 | Status: SHIPPED | OUTPATIENT
Start: 2020-08-18 | End: 2021-08-18

## 2020-08-26 ENCOUNTER — PATIENT OUTREACH (OUTPATIENT)
Dept: OTHER | Facility: OTHER | Age: 50
End: 2020-08-26

## 2020-09-11 ENCOUNTER — TELEPHONE (OUTPATIENT)
Dept: FAMILY MEDICINE | Facility: CLINIC | Age: 50
End: 2020-09-11

## 2020-09-11 NOTE — TELEPHONE ENCOUNTER
----- Message from Porsche Foreman sent at 9/11/2020 10:09 AM CDT -----  Contact: PREM ELI [[id number]]  Name of Who is Calling:PREM ELI [[id number]]      What is the request in detail: Patient would like orders to have A1C checked      Can the clinic reply by [facility]: no      [facility] Back [facility] [phone] (home)

## 2020-09-14 ENCOUNTER — LAB VISIT (OUTPATIENT)
Dept: LAB | Facility: HOSPITAL | Age: 50
End: 2020-09-14
Attending: FAMILY MEDICINE
Payer: COMMERCIAL

## 2020-09-14 DIAGNOSIS — E11.9 TYPE 2 DIABETES MELLITUS WITHOUT COMPLICATION, WITHOUT LONG-TERM CURRENT USE OF INSULIN: ICD-10-CM

## 2020-09-14 LAB
ALBUMIN/CREAT UR: 3.5 UG/MG (ref 0–30)
CREAT UR-MCNC: 143 MG/DL (ref 23–375)
ESTIMATED AVG GLUCOSE: 97 MG/DL (ref 68–131)
HBA1C MFR BLD HPLC: 5 % (ref 4–5.6)
MICROALBUMIN UR DL<=1MG/L-MCNC: 5 UG/ML

## 2020-09-14 PROCEDURE — 36415 COLL VENOUS BLD VENIPUNCTURE: CPT | Mod: PO

## 2020-09-14 PROCEDURE — 82043 UR ALBUMIN QUANTITATIVE: CPT

## 2020-09-14 PROCEDURE — 83036 HEMOGLOBIN GLYCOSYLATED A1C: CPT

## 2020-09-14 NOTE — PROGRESS NOTES
Digital Medicine: Health  Follow-Up    The history is provided by the patient.             Reason for review: Blood pressure at goal    Patient needs assistance troubleshooting: Patient thought he was out of test strips, but he found a box and says he will take a reading tomorrow..      Topics Covered on Call: physical activity and Diet    Additional Follow-up details: Patient says he is feeling well and denies any symptoms of hypotension.  He attributes lower BP readings to weight loss.  Patient says he will take a BG reading tomorrow.        Diet-no change to diet    No change to diet.  Patient reports eating or drinking the following: Patient says he is still eating low sodium, diabetic, weight loss diet and has lost 40 lbs.      Physical Activity-no change to routine  No change to exercise routine.     Medication Adherence-Medication adherence was asssessed.  Patient continue taking medication as prescribed.  Patient reported missing medication: patient is wondering if he needs to reduce BP meds..      Substance, Sleep, Stress-Not assessed      Continue current diet/physical activity routine.  Provided patient education.       Addressed any questions or concerns and patient has my contact information if needed prior to next outreach. Patient verbalizes understanding.      Explained the importance of self-monitoring and medication adherence. Encouraged the patient to communicate with their health  for lifestyle modifications to help improve or maintain a healthy lifestyle.                Topic    Hemoglobin A1C     Eye Exam        Last 5 Patient Entered Readings                                      Current 30 Day Average: 103/59     Recent Readings 9/13/2020 9/13/2020 9/12/2020 9/12/2020 9/8/2020    SBP (mmHg) 96 97 95 96 95    DBP (mmHg) 49 48 57 55 56    Pulse 61 73 54 58 75        Last 6 Patient Entered Readings                                          Most Recent A1c: 5.9% on 1/13/2020  (Goal: 7%)      Recent Readings 7/26/2020 7/24/2020 7/21/2020 7/20/2020 7/19/2020    Blood Glucose (mg/dL) 105 135 113 136 134

## 2020-09-15 RX ORDER — FENOFIBRATE 160 MG/1
160 TABLET ORAL DAILY
Qty: 90 TABLET | Refills: 1 | Status: SHIPPED | OUTPATIENT
Start: 2020-09-15

## 2020-10-05 DIAGNOSIS — I15.2 HYPERTENSION ASSOCIATED WITH DIABETES: ICD-10-CM

## 2020-10-05 DIAGNOSIS — E11.59 HYPERTENSION ASSOCIATED WITH DIABETES: ICD-10-CM

## 2020-10-05 RX ORDER — AMLODIPINE BESYLATE 10 MG/1
10 TABLET ORAL DAILY
Qty: 90 TABLET | Refills: 1 | Status: SHIPPED | OUTPATIENT
Start: 2020-10-05 | End: 2020-10-06 | Stop reason: SDUPTHER

## 2020-10-06 ENCOUNTER — PATIENT OUTREACH (OUTPATIENT)
Dept: OTHER | Facility: OTHER | Age: 50
End: 2020-10-06

## 2020-10-06 DIAGNOSIS — I15.2 HYPERTENSION ASSOCIATED WITH DIABETES: ICD-10-CM

## 2020-10-06 DIAGNOSIS — E11.59 HYPERTENSION ASSOCIATED WITH DIABETES: ICD-10-CM

## 2020-10-06 RX ORDER — AMLODIPINE BESYLATE 5 MG/1
5 TABLET ORAL DAILY
Qty: 90 TABLET | Refills: 1 | Status: SHIPPED | OUTPATIENT
Start: 2020-10-06 | End: 2021-01-13 | Stop reason: SDUPTHER

## 2020-10-06 NOTE — PROGRESS NOTES
Digital Medicine: Clinician Follow-Up    Back bothering him - unsure why. Directed him to PCP and to stretch.   Lost 50 pounds.     Had a few beers over the weekend and noted his BP to be elevated the next day. He had given up beer and was interested to see the effect having 6-7 beers had on his control.     Patient stopped taking metformin on his own, A1c is well controlled. He is not sure when he stopped, but has not taken it in >1 month.     Task placed by HC to discuss BP regimen with patient, and potential to reduce further.     The history is provided by the patient.      Review of patient's allergies indicates:  No Known Allergies    Hypertension    Patient's blood pressure is stable.   Diabetes    Patient's blood glucose is stable.   Patient is not experiencing signs/symptoms of hypotension.  Patient is not experiencing signs/symptoms of hypertension.  Patient is not experiencing signs/symptoms of hypoglycemia.  Patient is not experiencing signs/symptoms of hyperglycemia.      Patient Characteristics    Last 5 Patient Entered Readings                                      Current 30 Day Average: 104/62     Recent Readings 10/5/2020 10/5/2020 10/4/2020 10/4/2020 10/2/2020    SBP (mmHg) 118 110 147 152 115    DBP (mmHg) 74 71 74 76 67    Pulse 61 61 57 58 55        Last 6 Patient Entered Readings                                          Most Recent A1c: 5% on 9/14/2020  (Goal: 7%)     Recent Readings 10/5/2020 9/26/2020 9/25/2020 9/24/2020 9/23/2020    Blood Glucose (mg/dL) 126 96 108 83 112               Depression Screening  Did not address depression screening.    Sleep Apnea Screening    Did not address sleep apnea screening.     Medication Affordability Screening  Patient screened negative on the medication affordability questionnaires. Patient is currently not having problems affording medications    Medication Adherence-Medication adherence was asssessed.        Patient discontinued metformin on his own,  unsure when. BG is controlled. No hypoglycemia.       ASSESSMENT(S)  Patients BP average is 104/62 mmHg, which is at goal. Patient's BP goal is less than or equal to 130/80.  Patient's A1C goal is less than or equal to 7. Patient's most recent A1C result is at goal. Lab Results    Component                Value               Date                     HGBA1C                   5.0                 09/14/2020          .       Hypertension Plan  Medication change. Reduce amlodipine to 5 mg daily.   Continue current diet/physical activity routine.  Await MD intervention. Regarding back pain.     Diabetes Plan  Medication change. Metformin discontinued  Continue current diet/physical activity routine.       Addressed patient questions and patient has my contact information if needed prior to next outreach. Patient verbalizes understanding.                Topic    Eye Exam      There are no preventive care reminders to display for this patient.      Hypertension Medications             amLODIPine (NORVASC) 10 MG tablet Take 1 tablet (10 mg total) by mouth once daily.    hydroCHLOROthiazide (HYDRODIURIL) 12.5 MG Tab Take 1 tablet (12.5 mg total) by mouth once daily.    valsartan (DIOVAN) 320 MG tablet Take 1 tablet (320 mg total) by mouth once daily.        Diabetes Medications             metFORMIN (GLUCOPHAGE-XR) 500 MG XR 24hr tablet Take 2 tablets (1,000 mg total) by mouth daily with breakfast.

## 2020-11-07 ENCOUNTER — PATIENT MESSAGE (OUTPATIENT)
Dept: ADMINISTRATIVE | Facility: OTHER | Age: 50
End: 2020-11-07

## 2020-11-09 ENCOUNTER — PATIENT OUTREACH (OUTPATIENT)
Dept: OTHER | Facility: OTHER | Age: 50
End: 2020-11-09

## 2020-11-16 NOTE — PROGRESS NOTES
Digital Medicine: Health  Follow-Up    The history is provided by the patient.             Reason for review: Blood pressure at goal    Patient needs assistance troubleshooting: patient reminder needed.      Topics Covered on Call: physical activity and Diet    Additional Follow-up details: Patient says he is feeling well.  I asked him to take more consistent BG readings.  He agreed to do so.  He states his BG had been good and he doesn't like sticking his finger.  Patient attributes higher BP readings to beer consumption and missing doses of BP medicine.  He denies any symptoms of hypertension.            Diet-no change to diet    No change to diet.  Patient reports eating or drinking the following: Patient states he has lost a total of 65 lbs and is maintaining his weight loss.      Physical Activity-no change to routine  No change to exercise routine.     Medication Adherence-Medication adherence was asssessed.    Patient reported missing medication: once a month.    Patient identified the following reasons for non-adherence:  Patient forgets.        Substance, Sleep, Stress-Not assessed      Continue current diet/physical activity routine.  Provided patient education. Alcohol consumption effects on BP.       Addressed patient questions and patient has my contact information if needed prior to next outreach. Patient verbalizes understanding.      Explained the importance of self-monitoring and medication adherence. Encouraged the patient to communicate with their health  for lifestyle modifications to help improve or maintain a healthy lifestyle.                   Topic    Eye Exam          Last 5 Patient Entered Readings                                      Current 30 Day Average: 129/77     Recent Readings 11/15/2020 11/15/2020 11/15/2020 11/9/2020 11/6/2020    SBP (mmHg) 142 142 152 140 114    DBP (mmHg) 77 78 83 87 68    Pulse 63 63 70 72 75        Last 6 Patient Entered Readings                                           Most Recent A1c: 5% on 9/14/2020  (Goal: 7%)     Recent Readings 10/9/2020 10/5/2020 9/26/2020 9/25/2020 9/24/2020    Blood Glucose (mg/dL) 100 126 96 108 83

## 2020-11-17 ENCOUNTER — PATIENT OUTREACH (OUTPATIENT)
Dept: OTHER | Facility: OTHER | Age: 50
End: 2020-11-17

## 2020-11-17 NOTE — PROGRESS NOTES
Digital Medicine: Clinician Follow-Up    Mr Garcia is doing well. No complaints. No hypertensive signs or symptoms.   Tolerating reduced dose of amlodipine. BP average has increased, as expected.     Patient continues to see BP elevation with beer consumption.     Has maintained weight loss.     The history is provided by the patient.   Follow-up reason(s): medication change follow-up and routine follow up.     Hypertension    Readings are trending up   Patient is not experiencing signs/symptoms of hypotension.  Patient is not experiencing signs/symptoms of hypertension.  Patient is not experiencing signs/symptoms of hypoglycemia.  Patient is not experiencing signs/symptoms of hyperglycemia.    Patient did make medication change.    Is patient tolerating med change? yes            Last 5 Patient Entered Readings                                      Current 30 Day Average: 131/78     Recent Readings 11/15/2020 11/15/2020 11/15/2020 11/9/2020 11/6/2020    SBP (mmHg) 142 142 152 140 114    DBP (mmHg) 77 78 83 87 68    Pulse 63 63 70 72 75        Last 6 Patient Entered Readings                                          Most Recent A1c: 5% on 9/14/2020  (Goal: 7%)     Recent Readings 10/9/2020 10/5/2020 9/26/2020 9/25/2020 9/24/2020    Blood Glucose (mg/dL) 100 126 96 108 83               Depression Screening  Did not address depression screening.    Sleep Apnea Screening    Did not address sleep apnea screening.     Medication Affordability Screening  Patient screened negative on the medication affordability questionnaires. Patient is currently not having problems affording medications    Medication Adherence-Medication adherence was assessed.          ASSESSMENT(S)  Patients BP average is 131/78 mmHg, which is at goal. Patient's BP goal is less than or equal to 130/80.  Patient's A1C goal is less than or equal to 7. Patient's most recent A1C result is at goal. Lab Results    Component                Value                Date                     HGBA1C                   5.0                 09/14/2020          .       Recent upward trend with alcohol intake.     A1c controlled to goal. Minimal readings. Unable to assess current control.       Hypertension Plan  Continue current therapy.  Continue current diet/physical activity routine. Reduce alcohol intake.     Diabetes Plan  Additional monitoring needed.  Continue current therapy.  Continue current diet/physical activity routine.       Addressed patient questions and patient has my contact information if needed prior to next outreach. Patient verbalizes understanding.      Explained the importance of self-monitoring and medication adherence. Encouraged the patient to communicate with their health  for lifestyle modifications to help improve or maintain a healthy lifestyle.                   Topic    Eye Exam      There are no preventive care reminders to display for this patient.      Hypertension Medications             amLODIPine (NORVASC) 5 MG tablet Take 1 tablet (5 mg total) by mouth once daily.    valsartan (DIOVAN) 320 MG tablet Take 1 tablet (320 mg total) by mouth once daily.

## 2020-11-25 ENCOUNTER — CLINICAL SUPPORT (OUTPATIENT)
Dept: URGENT CARE | Facility: CLINIC | Age: 50
End: 2020-11-25
Payer: COMMERCIAL

## 2020-11-25 DIAGNOSIS — Z11.59 SCREENING FOR VIRAL DISEASE: Primary | ICD-10-CM

## 2020-11-25 LAB
CTP QC/QA: YES
SARS-COV-2 RDRP RESP QL NAA+PROBE: NEGATIVE

## 2020-11-25 PROCEDURE — U0002 COVID-19 LAB TEST NON-CDC: HCPCS | Mod: QW,S$GLB,, | Performed by: FAMILY MEDICINE

## 2020-11-25 PROCEDURE — U0002: ICD-10-PCS | Mod: QW,S$GLB,, | Performed by: FAMILY MEDICINE

## 2020-12-31 ENCOUNTER — PATIENT OUTREACH (OUTPATIENT)
Dept: OTHER | Facility: OTHER | Age: 50
End: 2020-12-31

## 2021-07-15 ENCOUNTER — PATIENT MESSAGE (OUTPATIENT)
Dept: INTERNAL MEDICINE | Facility: CLINIC | Age: 51
End: 2021-07-15